# Patient Record
Sex: MALE | Race: WHITE | NOT HISPANIC OR LATINO | URBAN - METROPOLITAN AREA
[De-identification: names, ages, dates, MRNs, and addresses within clinical notes are randomized per-mention and may not be internally consistent; named-entity substitution may affect disease eponyms.]

---

## 2018-04-01 LAB
BILIRUB UR QL STRIP: NEGATIVE MG/DL
CLARITY UR: CLEAR
COLOR UR: YELLOW
COMMENT (HISTORICAL): NORMAL
GLUCOSE UR STRIP-MCNC: NEGATIVE MG/DL
HGB UR QL STRIP.AUTO: NEGATIVE
KETONES UR STRIP-MCNC: NEGATIVE MG/DL
LEUKOCYTE ESTERASE UR QL STRIP: NEGATIVE
NITRITE UR QL STRIP: NEGATIVE
PH UR STRIP.AUTO: 5 [PH] (ref 4.5–8)
PROT UR STRIP-MCNC: NEGATIVE MG/DL
SP GR UR STRIP.AUTO: 1.02 (ref 1–1.04)
UROBILINOGEN UR QL STRIP.AUTO: NEGATIVE MG/DL (ref 0–1)

## 2018-05-17 LAB
ABSOL LYMPHOCYTES (HISTORICAL): 2.1 K/UL (ref 0.5–4)
ALBUMIN SERPL BCP-MCNC: 4.5 G/DL (ref 3–5.2)
ALP SERPL-CCNC: 62 U/L (ref 43–122)
ALT SERPL W P-5'-P-CCNC: 26 U/L (ref 9–52)
ANION GAP SERPL CALCULATED.3IONS-SCNC: 10 MMOL/L (ref 5–14)
AST SERPL W P-5'-P-CCNC: 22 U/L (ref 17–59)
BASOPHILS # BLD AUTO: 0.1 K/UL (ref 0–0.1)
BASOPHILS # BLD AUTO: 1 % (ref 0–1)
BILIRUB SERPL-MCNC: 0.6 MG/DL
BUN SERPL-MCNC: 11 MG/DL (ref 5–25)
CALCIUM SERPL-MCNC: 9.7 MG/DL (ref 8.4–10.2)
CHLORIDE SERPL-SCNC: 104 MEQ/L (ref 97–108)
CHOLEST SERPL-MCNC: 299 MG/DL
CHOLEST/HDLC SERPL: 6.4 {RATIO}
CO2 SERPL-SCNC: 27 MMOL/L (ref 22–30)
CREATINE, SERUM (HISTORICAL): 0.91 MG/DL (ref 0.7–1.5)
DEPRECATED RDW RBC AUTO: 13.2 %
EGFR (HISTORICAL): >60 ML/MIN/1.73 M2
EOSINOPHIL # BLD AUTO: 0.2 K/UL (ref 0–0.4)
EOSINOPHIL NFR BLD AUTO: 3 % (ref 0–6)
ERYTHROCYTE SEDIMENTATION RATE (HISTORICAL): 6 MM (ref 1–20)
EST. AVERAGE GLUCOSE BLD GHB EST-MCNC: 120 MG/DL
GLUCOSE SERPL-MCNC: 102 MG/DL (ref 70–99)
HBA1C MFR BLD HPLC: 5.8 %
HCT VFR BLD AUTO: 46 % (ref 41–53)
HDLC SERPL-MCNC: 47 MG/DL
HGB BLD-MCNC: 15.1 G/DL (ref 13.5–17.5)
LDL/HDL RATIO (HISTORICAL): ABNORMAL
LDLC SERPL CALC-MCNC: ABNORMAL MG/DL
LYMPHOCYTES NFR BLD AUTO: 35 % (ref 25–45)
MCH RBC QN AUTO: 29.6 PG (ref 26–34)
MCHC RBC AUTO-ENTMCNC: 32.9 % (ref 31–36)
MCV RBC AUTO: 90 FL (ref 80–100)
MONOCYTES # BLD AUTO: 0.5 K/UL (ref 0.2–0.9)
MONOCYTES NFR BLD AUTO: 9 % (ref 1–10)
NEUTROPHILS ABS COUNT (HISTORICAL): 3.1 K/UL (ref 1.8–7.8)
NEUTS SEG NFR BLD AUTO: 52 % (ref 45–65)
PLATELET # BLD AUTO: 240 K/MCL (ref 150–450)
POTASSIUM SERPL-SCNC: 5.1 MEQ/L (ref 3.6–5)
PSA (HISTORICAL): 0.89 NG/ML
RBC # BLD AUTO: 5.11 M/MCL (ref 4.5–5.9)
SODIUM SERPL-SCNC: 141 MEQ/L (ref 137–147)
TOTAL PROTEIN (HISTORICAL): 7.2 G/DL (ref 5.9–8.4)
TRIGL SERPL-MCNC: 461 MG/DL
TSH SERPL DL<=0.05 MIU/L-ACNC: 3.59 UIU/ML (ref 0.47–4.68)
VLDLC SERPL CALC-MCNC: ABNORMAL MG/DL (ref 0–40)
WBC # BLD AUTO: 6 K/MCL (ref 4.5–11)

## 2018-05-18 LAB — RHEUMATOID FACTOR (HISTORICAL): <8.6 IU/ML

## 2018-05-19 LAB — TESTOSTERONE TOTAL (HISTORICAL): 268

## 2018-05-20 LAB — ANTI-NUCLEAR ANTIBODY (ANA) (HISTORICAL): NORMAL

## 2018-06-26 ENCOUNTER — OFFICE VISIT (OUTPATIENT)
Dept: FAMILY MEDICINE CLINIC | Facility: CLINIC | Age: 53
End: 2018-06-26
Payer: COMMERCIAL

## 2018-06-26 ENCOUNTER — TELEPHONE (OUTPATIENT)
Dept: FAMILY MEDICINE CLINIC | Facility: CLINIC | Age: 53
End: 2018-06-26

## 2018-06-26 DIAGNOSIS — R37 SEXUAL DYSFUNCTION: Primary | ICD-10-CM

## 2018-06-26 LAB — TESTOST SERPL-MCNC: 97 NG/DL (ref 95–948)

## 2018-06-26 PROCEDURE — 84403 ASSAY OF TOTAL TESTOSTERONE: CPT | Performed by: NURSE PRACTITIONER

## 2018-06-26 PROCEDURE — 36415 COLL VENOUS BLD VENIPUNCTURE: CPT | Performed by: NURSE PRACTITIONER

## 2018-06-27 ENCOUNTER — TELEPHONE (OUTPATIENT)
Dept: FAMILY MEDICINE CLINIC | Facility: CLINIC | Age: 53
End: 2018-06-27

## 2018-06-27 DIAGNOSIS — E29.1 HYPOGONADISM, MALE: Primary | ICD-10-CM

## 2018-06-27 NOTE — PROGRESS NOTES
Spoke to pt and he has only been on the med for two wks I did a nurse visit apt and also put the order in for more bw

## 2018-06-27 NOTE — TELEPHONE ENCOUNTER
----- Message from Gordon Brown, [address] sent at 6/27/2018  6:55 AM EDT -----  Call him and see how long he has been on the testosterone   I think only a few weeks   Need to cont on it and recheck testosterone total in 8 weeks

## 2018-06-27 NOTE — TELEPHONE ENCOUNTER
----- Message from Emy Nuñez, [address] sent at 6/27/2018  6:55 AM EDT -----  Call him and see how long he has been on the testosterone   I think only a few weeks   Need to cont on it and recheck testosterone total in 8 weeks

## 2018-06-27 NOTE — PROGRESS NOTES
Please call the patient regarding his abnormal result   I spoke to pt he has only been on the med for two weeks and I did the nurse visit and also put the order in for more bw

## 2018-06-30 DIAGNOSIS — I15.9 SECONDARY HYPERTENSION: Primary | ICD-10-CM

## 2018-07-02 ENCOUNTER — TELEPHONE (OUTPATIENT)
Dept: FAMILY MEDICINE CLINIC | Facility: CLINIC | Age: 53
End: 2018-07-02

## 2018-07-02 DIAGNOSIS — F41.9 ANXIETY: Primary | ICD-10-CM

## 2018-07-02 RX ORDER — LORAZEPAM 1 MG/1
1 TABLET ORAL 2 TIMES DAILY PRN
Qty: 60 TABLET | Refills: 0 | Status: SHIPPED | OUTPATIENT
Start: 2018-07-02 | End: 2018-08-15 | Stop reason: SDUPTHER

## 2018-07-02 RX ORDER — METOPROLOL TARTRATE 50 MG/1
TABLET, FILM COATED ORAL
Qty: 180 TABLET | Refills: 3 | Status: SHIPPED | OUTPATIENT
Start: 2018-07-02 | End: 2019-07-06 | Stop reason: SDUPTHER

## 2018-07-02 RX ORDER — LORAZEPAM 1 MG/1
TABLET ORAL
COMMUNITY
Start: 2018-02-06 | End: 2018-07-02 | Stop reason: SDUPTHER

## 2018-07-02 NOTE — TELEPHONE ENCOUNTER
Patient called he said he needs his lorazepam 1mg [facility] [phone] he said he needs it as soon as possible did inform him that you dont work on [address]   Pt # [phone]   TY

## 2018-07-17 DIAGNOSIS — G47.9 SLEEPING DIFFICULTIES: Primary | ICD-10-CM

## 2018-07-17 RX ORDER — ZOLPIDEM TARTRATE 10 MG/1
10 TABLET ORAL DAILY
Qty: 30 TABLET | Refills: 0 | Status: SHIPPED | OUTPATIENT
Start: 2018-07-17 | End: 2018-11-13 | Stop reason: SDUPTHER

## 2018-07-17 RX ORDER — ZOLPIDEM TARTRATE 10 MG/1
10 TABLET ORAL DAILY
COMMUNITY
Start: 2018-05-17 | End: 2018-07-17 | Stop reason: SDUPTHER

## 2018-07-22 DIAGNOSIS — E78.5 HYPERLIPIDEMIA, UNSPECIFIED HYPERLIPIDEMIA TYPE: Primary | ICD-10-CM

## 2018-07-23 RX ORDER — OMEGA-3-ACID ETHYL ESTERS 1 G/1
CAPSULE, LIQUID FILLED ORAL
Qty: 60 CAPSULE | Refills: 0 | Status: SHIPPED | OUTPATIENT
Start: 2018-07-23 | End: 2021-01-21 | Stop reason: ALTCHOICE

## 2018-08-04 DIAGNOSIS — F41.9 ANXIETY: Primary | ICD-10-CM

## 2018-08-05 RX ORDER — FLUOXETINE HYDROCHLORIDE 40 MG/1
CAPSULE ORAL
Qty: 90 CAPSULE | Refills: 1 | Status: SHIPPED | OUTPATIENT
Start: 2018-08-05 | End: 2018-10-28 | Stop reason: SDUPTHER

## 2018-08-15 DIAGNOSIS — F41.9 ANXIETY: ICD-10-CM

## 2018-08-15 RX ORDER — LORAZEPAM 1 MG/1
1 TABLET ORAL 2 TIMES DAILY PRN
Qty: 60 TABLET | Refills: 0 | Status: SHIPPED | OUTPATIENT
Start: 2018-08-15 | End: 2018-09-21 | Stop reason: SDUPTHER

## 2018-09-21 DIAGNOSIS — F41.9 ANXIETY: ICD-10-CM

## 2018-09-21 RX ORDER — LORAZEPAM 1 MG/1
1 TABLET ORAL 2 TIMES DAILY PRN
Qty: 60 TABLET | Refills: 0 | Status: SHIPPED | OUTPATIENT
Start: 2018-09-21 | End: 2018-11-13 | Stop reason: SDUPTHER

## 2018-10-28 DIAGNOSIS — F41.9 ANXIETY: ICD-10-CM

## 2018-10-30 RX ORDER — FLUOXETINE HYDROCHLORIDE 40 MG/1
CAPSULE ORAL
Qty: 90 CAPSULE | Refills: 0 | Status: SHIPPED | OUTPATIENT
Start: 2018-10-30 | End: 2019-01-30 | Stop reason: SDUPTHER

## 2018-11-13 DIAGNOSIS — G47.9 SLEEPING DIFFICULTIES: ICD-10-CM

## 2018-11-13 DIAGNOSIS — F41.9 ANXIETY: ICD-10-CM

## 2018-11-13 RX ORDER — LORAZEPAM 1 MG/1
1 TABLET ORAL 2 TIMES DAILY PRN
Qty: 60 TABLET | Refills: 0 | Status: SHIPPED | OUTPATIENT
Start: 2018-11-13 | End: 2019-01-07 | Stop reason: SDUPTHER

## 2018-11-13 RX ORDER — ZOLPIDEM TARTRATE 10 MG/1
10 TABLET ORAL DAILY
Qty: 30 TABLET | Refills: 0 | Status: SHIPPED | OUTPATIENT
Start: 2018-11-13 | End: 2019-01-07 | Stop reason: SDUPTHER

## 2019-01-07 DIAGNOSIS — G47.9 SLEEPING DIFFICULTIES: ICD-10-CM

## 2019-01-07 DIAGNOSIS — F41.9 ANXIETY: ICD-10-CM

## 2019-01-08 RX ORDER — ZOLPIDEM TARTRATE 10 MG/1
10 TABLET ORAL DAILY
Qty: 30 TABLET | Refills: 0 | Status: SHIPPED | OUTPATIENT
Start: 2019-01-08 | End: 2019-02-15 | Stop reason: SDUPTHER

## 2019-01-08 RX ORDER — LORAZEPAM 1 MG/1
1 TABLET ORAL 2 TIMES DAILY PRN
Qty: 60 TABLET | Refills: 0 | Status: SHIPPED | OUTPATIENT
Start: 2019-01-08 | End: 2019-02-15 | Stop reason: SDUPTHER

## 2019-01-30 DIAGNOSIS — F41.9 ANXIETY: ICD-10-CM

## 2019-01-30 RX ORDER — FLUOXETINE HYDROCHLORIDE 40 MG/1
CAPSULE ORAL
Qty: 90 CAPSULE | Refills: 0 | Status: SHIPPED | OUTPATIENT
Start: 2019-01-30 | End: 2019-05-08 | Stop reason: SDUPTHER

## 2019-02-15 DIAGNOSIS — G47.9 SLEEPING DIFFICULTIES: ICD-10-CM

## 2019-02-15 DIAGNOSIS — F41.9 ANXIETY: ICD-10-CM

## 2019-02-15 RX ORDER — LORAZEPAM 1 MG/1
1 TABLET ORAL 2 TIMES DAILY PRN
Qty: 60 TABLET | Refills: 0 | Status: SHIPPED | OUTPATIENT
Start: 2019-02-15 | End: 2019-03-19 | Stop reason: SDUPTHER

## 2019-02-15 RX ORDER — ZOLPIDEM TARTRATE 10 MG/1
10 TABLET ORAL DAILY
Qty: 30 TABLET | Refills: 0 | Status: SHIPPED | OUTPATIENT
Start: 2019-02-15 | End: 2019-03-19 | Stop reason: SDUPTHER

## 2019-03-19 DIAGNOSIS — F41.9 ANXIETY: ICD-10-CM

## 2019-03-19 DIAGNOSIS — G47.9 SLEEPING DIFFICULTIES: ICD-10-CM

## 2019-03-19 RX ORDER — LORAZEPAM 1 MG/1
1 TABLET ORAL 2 TIMES DAILY PRN
Qty: 60 TABLET | Refills: 0 | Status: SHIPPED | OUTPATIENT
Start: 2019-03-19 | End: 2019-04-23 | Stop reason: SDUPTHER

## 2019-03-19 RX ORDER — ZOLPIDEM TARTRATE 10 MG/1
10 TABLET ORAL DAILY
Qty: 30 TABLET | Refills: 0 | Status: SHIPPED | OUTPATIENT
Start: 2019-03-19 | End: 2019-04-23 | Stop reason: SDUPTHER

## 2019-04-23 DIAGNOSIS — F41.9 ANXIETY: ICD-10-CM

## 2019-04-23 DIAGNOSIS — G47.9 SLEEPING DIFFICULTIES: ICD-10-CM

## 2019-04-23 RX ORDER — ZOLPIDEM TARTRATE 10 MG/1
10 TABLET ORAL DAILY
Qty: 30 TABLET | Refills: 3 | Status: SHIPPED | OUTPATIENT
Start: 2019-04-23 | End: 2019-06-10 | Stop reason: SDUPTHER

## 2019-04-23 RX ORDER — LORAZEPAM 1 MG/1
1 TABLET ORAL 2 TIMES DAILY PRN
Qty: 60 TABLET | Refills: 3 | Status: SHIPPED | OUTPATIENT
Start: 2019-04-23 | End: 2019-06-10 | Stop reason: SDUPTHER

## 2019-05-08 DIAGNOSIS — F41.9 ANXIETY: ICD-10-CM

## 2019-05-08 RX ORDER — FLUOXETINE HYDROCHLORIDE 40 MG/1
CAPSULE ORAL
Qty: 90 CAPSULE | Refills: 0 | Status: SHIPPED | OUTPATIENT
Start: 2019-05-08 | End: 2019-09-05 | Stop reason: SDUPTHER

## 2019-06-10 DIAGNOSIS — F41.9 ANXIETY: ICD-10-CM

## 2019-06-10 DIAGNOSIS — G47.9 SLEEPING DIFFICULTIES: ICD-10-CM

## 2019-06-10 RX ORDER — LORAZEPAM 1 MG/1
1 TABLET ORAL 2 TIMES DAILY PRN
Qty: 60 TABLET | Refills: 3 | Status: SHIPPED | OUTPATIENT
Start: 2019-06-10 | End: 2019-12-19 | Stop reason: SDUPTHER

## 2019-06-10 RX ORDER — ZOLPIDEM TARTRATE 10 MG/1
10 TABLET ORAL DAILY
Qty: 30 TABLET | Refills: 3 | Status: SHIPPED | OUTPATIENT
Start: 2019-06-10 | End: 2019-12-30 | Stop reason: SDUPTHER

## 2019-06-25 DIAGNOSIS — E78.5 HYPERLIPIDEMIA, UNSPECIFIED HYPERLIPIDEMIA TYPE: Primary | ICD-10-CM

## 2019-06-25 RX ORDER — SIMVASTATIN 20 MG
20 TABLET ORAL
Qty: 90 TABLET | Refills: 3 | Status: SHIPPED | OUTPATIENT
Start: 2019-06-25 | End: 2019-11-26 | Stop reason: SDUPTHER

## 2019-07-06 DIAGNOSIS — I15.9 SECONDARY HYPERTENSION: ICD-10-CM

## 2019-07-08 RX ORDER — METOPROLOL TARTRATE 50 MG/1
TABLET, FILM COATED ORAL
Qty: 60 TABLET | Refills: 0 | Status: SHIPPED | OUTPATIENT
Start: 2019-07-08 | End: 2019-08-08 | Stop reason: SDUPTHER

## 2019-07-17 ENCOUNTER — TELEPHONE (OUTPATIENT)
Dept: FAMILY MEDICINE CLINIC | Facility: CLINIC | Age: 54
End: 2019-07-17

## 2019-07-19 DIAGNOSIS — Z87.19 H/O GASTROESOPHAGEAL REFLUX (GERD): Primary | ICD-10-CM

## 2019-07-19 RX ORDER — LANSOPRAZOLE 30 MG/1
30 CAPSULE, DELAYED RELEASE ORAL DAILY
Qty: 90 CAPSULE | Refills: 3 | Status: SHIPPED | OUTPATIENT
Start: 2019-07-19 | End: 2021-01-21 | Stop reason: SDUPTHER

## 2019-07-19 RX ORDER — LANSOPRAZOLE 30 MG/1
30 CAPSULE, DELAYED RELEASE ORAL DAILY
Refills: 3 | COMMUNITY
Start: 2019-06-19 | End: 2019-07-19 | Stop reason: SDUPTHER

## 2019-08-08 DIAGNOSIS — I15.9 SECONDARY HYPERTENSION: ICD-10-CM

## 2019-08-08 RX ORDER — METOPROLOL TARTRATE 50 MG/1
TABLET, FILM COATED ORAL
Qty: 60 TABLET | Refills: 0 | Status: SHIPPED | OUTPATIENT
Start: 2019-08-08 | End: 2019-09-06 | Stop reason: SDUPTHER

## 2019-09-05 DIAGNOSIS — F41.9 ANXIETY: ICD-10-CM

## 2019-09-05 RX ORDER — FLUOXETINE HYDROCHLORIDE 40 MG/1
CAPSULE ORAL
Qty: 90 CAPSULE | Refills: 0 | Status: SHIPPED | OUTPATIENT
Start: 2019-09-05 | End: 2019-12-13 | Stop reason: SDUPTHER

## 2019-09-06 DIAGNOSIS — I15.9 SECONDARY HYPERTENSION: ICD-10-CM

## 2019-09-06 RX ORDER — METOPROLOL TARTRATE 50 MG/1
TABLET, FILM COATED ORAL
Qty: 60 TABLET | Refills: 0 | Status: SHIPPED | OUTPATIENT
Start: 2019-09-06 | End: 2019-10-06 | Stop reason: SDUPTHER

## 2019-09-06 NOTE — TELEPHONE ENCOUNTER
Called pt lm to call office back  Per Cele Moreno his medication for Metoprolol was approved for refill but a appointment is needed for further refills  Pt hasn't been seen in over 1 year  Please advise thank you

## 2019-09-06 NOTE — TELEPHONE ENCOUNTER
Please call and advise we can not keep refilling without an appt  Needs to set up   Has been over 1 year

## 2019-09-19 ENCOUNTER — OFFICE VISIT (OUTPATIENT)
Dept: FAMILY MEDICINE CLINIC | Facility: CLINIC | Age: 54
End: 2019-09-19

## 2019-09-19 VITALS
SYSTOLIC BLOOD PRESSURE: 140 MMHG | DIASTOLIC BLOOD PRESSURE: 90 MMHG | WEIGHT: 227.1 LBS | BODY MASS INDEX: 33.64 KG/M2 | TEMPERATURE: 97.7 F | HEART RATE: 60 BPM | HEIGHT: 69 IN | RESPIRATION RATE: 18 BRPM | OXYGEN SATURATION: 98 %

## 2019-09-19 DIAGNOSIS — F51.01 PRIMARY INSOMNIA: ICD-10-CM

## 2019-09-19 DIAGNOSIS — M21.611 BUNION OF GREAT TOE OF RIGHT FOOT: ICD-10-CM

## 2019-09-19 DIAGNOSIS — E78.5 HYPERLIPIDEMIA, UNSPECIFIED HYPERLIPIDEMIA TYPE: ICD-10-CM

## 2019-09-19 DIAGNOSIS — I10 ESSENTIAL HYPERTENSION: ICD-10-CM

## 2019-09-19 DIAGNOSIS — L98.9 SKIN LESION: ICD-10-CM

## 2019-09-19 DIAGNOSIS — Z00.00 HEALTH MAINTENANCE EXAMINATION: Primary | ICD-10-CM

## 2019-09-19 PROCEDURE — 99214 OFFICE O/P EST MOD 30 MIN: CPT | Performed by: NURSE PRACTITIONER

## 2019-09-19 NOTE — PROGRESS NOTES
Assessment/Plan:         Diagnoses and all orders for this visit:    Health maintenance examination  No insurance so can't get labs   Will call and see price of lipids and cmp   If he can afford will let me know to order  If he gets insurance will let me know --needs colonoscopy,, PSA, referal to derm or plastic surgery for facial lesion   Hyperlipidemia, unspecified hyperlipidemia type  Needs lipids when he can afford or insurance  Primary insomnia  Discussed using ambien routinely      Essential hypertension  stable  Skin  Lesion  Possible BCC  Needs insurance or when he wants will refer to derm or plastic surgery   When he is ready will refer  Insurance an issue  Bunion great toe of right foot     Can't miss work wants no care at this time     Subjective:      Patient ID: Nayely Lopez is a 46 y o  male  HPI    Doing well   Occasionally needs the [place]  Sometimes needs the lorazepam    Has no insurance  The following portions of the patient's history were reviewed and updated as appropriate: allergies, current medications, past family history, past medical history, past social history, past surgical history and problem list     Review of Systems   Constitutional: Negative for activity change, appetite change, fatigue, fever and unexpected weight change  HENT: Negative for congestion, dental problem, postnasal drip and sneezing  Eyes: Negative for discharge and visual disturbance  Respiratory: Negative for cough and wheezing  Cardiovascular: Negative for chest pain  Gastrointestinal: Negative for abdominal pain, constipation, diarrhea, nausea and vomiting  Endocrine: Negative for polydipsia and polyuria  Genitourinary: Negative for dysuria and frequency  Musculoskeletal: Negative for arthralgias  Skin: Negative for rash  Allergic/Immunologic: Negative for environmental allergies and food allergies  Neurological: Negative for headaches     Hematological: Negative for adenopathy  Psychiatric/Behavioral: Positive for sleep disturbance  Negative for behavioral problems  The patient is nervous/anxious  Objective:  Vitals:    09/19/19 0710   BP: 140/90   BP Location: Left arm   Patient Position: Sitting   Cuff Size: Large   Pulse: 60   Resp: 18   Temp: 97 7 °F (36 5 °C)   TempSrc: Temporal   SpO2: 98%   Weight: 103 kg (227 lb 1 6 oz)   Height: 5' 9" (1 753 m)      Physical Exam   Constitutional: He is oriented to person, place, and time  He appears well-developed and well-nourished  HENT:   Head: Normocephalic  Right Ear: External ear normal    Left Ear: External ear normal    Nose: Nose normal    Mouth/Throat: Oropharynx is clear and moist    Eyes: Pupils are equal, round, and reactive to light  Conjunctivae are normal  Right eye exhibits no discharge  Left eye exhibits no discharge  Neck: Normal range of motion  Neck supple  No thyromegaly present  Cardiovascular: Normal rate, regular rhythm and normal heart sounds  No murmur heard  Pulmonary/Chest: Effort normal and breath sounds normal    Abdominal: Soft  Bowel sounds are normal  There is no tenderness  Musculoskeletal: Normal range of motion  Feet:    Lymphadenopathy:     He has no cervical adenopathy  Neurological: He is alert and oriented to person, place, and time  Skin: Skin is warm  No rash noted  Psychiatric: He has a normal mood and affect  His behavior is normal    Vitals reviewed

## 2019-10-06 DIAGNOSIS — I15.9 SECONDARY HYPERTENSION: ICD-10-CM

## 2019-10-07 ENCOUNTER — DOCUMENTATION (OUTPATIENT)
Dept: FAMILY MEDICINE CLINIC | Facility: CLINIC | Age: 54
End: 2019-10-07

## 2019-10-07 RX ORDER — METOPROLOL TARTRATE 50 MG/1
TABLET, FILM COATED ORAL
Qty: 180 TABLET | Refills: 1 | Status: SHIPPED | OUTPATIENT
Start: 2019-10-07 | End: 2020-04-07

## 2019-11-26 DIAGNOSIS — E78.5 HYPERLIPIDEMIA, UNSPECIFIED HYPERLIPIDEMIA TYPE: ICD-10-CM

## 2019-11-26 RX ORDER — SIMVASTATIN 20 MG
20 TABLET ORAL
Qty: 90 TABLET | Refills: 3 | Status: SHIPPED | OUTPATIENT
Start: 2019-11-26 | End: 2020-09-16 | Stop reason: SDUPTHER

## 2019-12-13 DIAGNOSIS — F41.9 ANXIETY: ICD-10-CM

## 2019-12-13 RX ORDER — FLUOXETINE HYDROCHLORIDE 40 MG/1
CAPSULE ORAL
Qty: 30 CAPSULE | Refills: 0 | Status: SHIPPED | OUTPATIENT
Start: 2019-12-13 | End: 2020-01-28

## 2019-12-19 DIAGNOSIS — F41.9 ANXIETY: ICD-10-CM

## 2019-12-20 RX ORDER — LORAZEPAM 1 MG/1
TABLET ORAL
Qty: 60 TABLET | Refills: 0 | Status: SHIPPED | OUTPATIENT
Start: 2019-12-20 | End: 2020-03-02

## 2019-12-30 DIAGNOSIS — G47.9 SLEEPING DIFFICULTIES: ICD-10-CM

## 2019-12-30 RX ORDER — ZOLPIDEM TARTRATE 10 MG/1
TABLET ORAL
Qty: 30 TABLET | Refills: 0 | Status: SHIPPED | OUTPATIENT
Start: 2019-12-30 | End: 2020-03-02

## 2020-01-27 DIAGNOSIS — F41.9 ANXIETY: ICD-10-CM

## 2020-01-28 RX ORDER — FLUOXETINE HYDROCHLORIDE 40 MG/1
CAPSULE ORAL
Qty: 30 CAPSULE | Refills: 0 | Status: SHIPPED | OUTPATIENT
Start: 2020-01-28 | End: 2020-03-03

## 2020-03-02 DIAGNOSIS — G47.9 SLEEPING DIFFICULTIES: ICD-10-CM

## 2020-03-02 DIAGNOSIS — F41.9 ANXIETY: ICD-10-CM

## 2020-03-02 RX ORDER — ZOLPIDEM TARTRATE 10 MG/1
TABLET ORAL
Qty: 30 TABLET | Refills: 0 | Status: SHIPPED | OUTPATIENT
Start: 2020-03-02 | End: 2020-04-16

## 2020-03-02 RX ORDER — LORAZEPAM 1 MG/1
TABLET ORAL
Qty: 60 TABLET | Refills: 0 | Status: SHIPPED | OUTPATIENT
Start: 2020-03-02 | End: 2020-04-22

## 2020-03-02 NOTE — TELEPHONE ENCOUNTER
pdmp checked for lorazepam and zolpidem  Lorazepam last filled 12/20/19 for 30 day supply  Zolpidem last filled 12/20/19 for 30 day supply

## 2020-03-03 RX ORDER — FLUOXETINE HYDROCHLORIDE 40 MG/1
CAPSULE ORAL
Qty: 30 CAPSULE | Refills: 0 | Status: SHIPPED | OUTPATIENT
Start: 2020-03-03 | End: 2020-03-30

## 2020-03-30 DIAGNOSIS — F41.9 ANXIETY: ICD-10-CM

## 2020-03-30 RX ORDER — FLUOXETINE HYDROCHLORIDE 40 MG/1
CAPSULE ORAL
Qty: 30 CAPSULE | Refills: 0 | Status: SHIPPED | OUTPATIENT
Start: 2020-03-30 | End: 2020-05-07

## 2020-04-07 DIAGNOSIS — I15.9 SECONDARY HYPERTENSION: ICD-10-CM

## 2020-04-07 RX ORDER — METOPROLOL TARTRATE 50 MG/1
TABLET, FILM COATED ORAL
Qty: 180 TABLET | Refills: 1 | Status: SHIPPED | OUTPATIENT
Start: 2020-04-07 | End: 2020-10-05 | Stop reason: SDUPTHER

## 2020-04-16 DIAGNOSIS — G47.9 SLEEPING DIFFICULTIES: ICD-10-CM

## 2020-04-16 RX ORDER — ZOLPIDEM TARTRATE 10 MG/1
TABLET ORAL
Qty: 30 TABLET | Refills: 5 | Status: SHIPPED | OUTPATIENT
Start: 2020-04-16 | End: 2020-11-15

## 2020-04-22 DIAGNOSIS — F41.9 ANXIETY: ICD-10-CM

## 2020-04-22 RX ORDER — LORAZEPAM 1 MG/1
TABLET ORAL
Qty: 60 TABLET | Refills: 0 | Status: SHIPPED | OUTPATIENT
Start: 2020-04-22 | End: 2020-07-25

## 2020-05-07 DIAGNOSIS — F41.9 ANXIETY: ICD-10-CM

## 2020-05-07 RX ORDER — FLUOXETINE HYDROCHLORIDE 40 MG/1
CAPSULE ORAL
Qty: 90 CAPSULE | Refills: 1 | Status: SHIPPED | OUTPATIENT
Start: 2020-05-07 | End: 2020-10-18

## 2020-07-25 DIAGNOSIS — F41.9 ANXIETY: ICD-10-CM

## 2020-07-25 RX ORDER — LORAZEPAM 1 MG/1
TABLET ORAL
Qty: 60 TABLET | Refills: 0 | Status: SHIPPED | OUTPATIENT
Start: 2020-07-25 | End: 2020-11-15

## 2020-09-16 DIAGNOSIS — E78.5 HYPERLIPIDEMIA, UNSPECIFIED HYPERLIPIDEMIA TYPE: ICD-10-CM

## 2020-09-16 RX ORDER — SIMVASTATIN 20 MG
20 TABLET ORAL
Qty: 90 TABLET | Refills: 3 | Status: SHIPPED | OUTPATIENT
Start: 2020-09-16 | End: 2021-10-27

## 2020-09-29 ENCOUNTER — TELEPHONE (OUTPATIENT)
Dept: FAMILY MEDICINE CLINIC | Facility: CLINIC | Age: 55
End: 2020-09-29

## 2020-09-29 DIAGNOSIS — Z00.00 HEALTH MAINTENANCE EXAMINATION: ICD-10-CM

## 2020-09-29 DIAGNOSIS — E78.5 HYPERLIPIDEMIA, UNSPECIFIED HYPERLIPIDEMIA TYPE: Primary | ICD-10-CM

## 2020-09-29 DIAGNOSIS — Z12.5 SCREENING FOR MALIGNANT NEOPLASM OF PROSTATE: ICD-10-CM

## 2020-09-29 DIAGNOSIS — I15.9 SECONDARY HYPERTENSION: ICD-10-CM

## 2020-09-29 NOTE — TELEPHONE ENCOUNTER
Patient calling requesting routine blood work  Patient states that he didn't have insurance and that Elvi advised when he got new insurance through work to call and request blood work  Please advise

## 2020-10-05 DIAGNOSIS — I15.9 SECONDARY HYPERTENSION: ICD-10-CM

## 2020-10-05 RX ORDER — METOPROLOL TARTRATE 50 MG/1
TABLET, FILM COATED ORAL
Qty: 180 TABLET | Refills: 0 | Status: SHIPPED | OUTPATIENT
Start: 2020-10-05 | End: 2021-02-02

## 2020-10-18 DIAGNOSIS — F41.9 ANXIETY: ICD-10-CM

## 2020-10-18 RX ORDER — FLUOXETINE HYDROCHLORIDE 40 MG/1
CAPSULE ORAL
Qty: 90 CAPSULE | Refills: 0 | Status: SHIPPED | OUTPATIENT
Start: 2020-10-18 | End: 2021-02-27

## 2020-11-13 DIAGNOSIS — G47.9 SLEEPING DIFFICULTIES: ICD-10-CM

## 2020-11-13 DIAGNOSIS — F41.9 ANXIETY: ICD-10-CM

## 2020-11-15 RX ORDER — ZOLPIDEM TARTRATE 10 MG/1
TABLET ORAL
Qty: 30 TABLET | Refills: 4 | Status: SHIPPED | OUTPATIENT
Start: 2020-11-15 | End: 2021-08-18

## 2020-11-15 RX ORDER — LORAZEPAM 1 MG/1
TABLET ORAL
Qty: 60 TABLET | Refills: 4 | Status: SHIPPED | OUTPATIENT
Start: 2020-11-15 | End: 2021-06-06

## 2021-01-21 ENCOUNTER — OFFICE VISIT (OUTPATIENT)
Dept: FAMILY MEDICINE CLINIC | Facility: CLINIC | Age: 56
End: 2021-01-21
Payer: COMMERCIAL

## 2021-01-21 VITALS
TEMPERATURE: 98 F | OXYGEN SATURATION: 98 % | WEIGHT: 225.3 LBS | RESPIRATION RATE: 16 BRPM | DIASTOLIC BLOOD PRESSURE: 80 MMHG | HEIGHT: 69 IN | HEART RATE: 56 BPM | SYSTOLIC BLOOD PRESSURE: 138 MMHG | BODY MASS INDEX: 33.37 KG/M2

## 2021-01-21 DIAGNOSIS — R73.09 ABNORMAL GLUCOSE: ICD-10-CM

## 2021-01-21 DIAGNOSIS — Z11.59 NEED FOR HEPATITIS C SCREENING TEST: ICD-10-CM

## 2021-01-21 DIAGNOSIS — K21.9 GASTROESOPHAGEAL REFLUX DISEASE WITHOUT ESOPHAGITIS: ICD-10-CM

## 2021-01-21 DIAGNOSIS — Z11.4 SCREENING FOR HIV (HUMAN IMMUNODEFICIENCY VIRUS): ICD-10-CM

## 2021-01-21 DIAGNOSIS — Z12.11 SCREENING FOR COLORECTAL CANCER: ICD-10-CM

## 2021-01-21 DIAGNOSIS — Z23 ENCOUNTER FOR IMMUNIZATION: ICD-10-CM

## 2021-01-21 DIAGNOSIS — Z87.19 H/O GASTROESOPHAGEAL REFLUX (GERD): ICD-10-CM

## 2021-01-21 DIAGNOSIS — Z00.00 ANNUAL PHYSICAL EXAM: Primary | ICD-10-CM

## 2021-01-21 DIAGNOSIS — I10 ESSENTIAL HYPERTENSION: ICD-10-CM

## 2021-01-21 DIAGNOSIS — M25.50 ARTHRALGIA, UNSPECIFIED JOINT: ICD-10-CM

## 2021-01-21 DIAGNOSIS — Z12.12 SCREENING FOR COLORECTAL CANCER: ICD-10-CM

## 2021-01-21 DIAGNOSIS — E78.5 HYPERLIPIDEMIA, UNSPECIFIED HYPERLIPIDEMIA TYPE: ICD-10-CM

## 2021-01-21 DIAGNOSIS — IMO0001 LOW SEXUAL DESIRE DISORDER: ICD-10-CM

## 2021-01-21 LAB
ALBUMIN SERPL BCP-MCNC: 4.2 G/DL (ref 3.5–5)
ALP SERPL-CCNC: 67 U/L (ref 46–116)
ALT SERPL W P-5'-P-CCNC: 47 U/L (ref 12–78)
ANION GAP SERPL CALCULATED.3IONS-SCNC: 3 MMOL/L (ref 4–13)
AST SERPL W P-5'-P-CCNC: 28 U/L (ref 5–45)
BASOPHILS # BLD AUTO: 0.03 THOUSANDS/ΜL (ref 0–0.1)
BASOPHILS NFR BLD AUTO: 1 % (ref 0–1)
BILIRUB SERPL-MCNC: 0.62 MG/DL (ref 0.2–1)
BUN SERPL-MCNC: 14 MG/DL (ref 5–25)
CALCIUM SERPL-MCNC: 9.3 MG/DL (ref 8.3–10.1)
CHLORIDE SERPL-SCNC: 105 MMOL/L (ref 100–108)
CHOLEST SERPL-MCNC: 246 MG/DL (ref 50–200)
CO2 SERPL-SCNC: 30 MMOL/L (ref 21–32)
CREAT SERPL-MCNC: 1.07 MG/DL (ref 0.6–1.3)
CRP SERPL QL: <3 MG/L
EOSINOPHIL # BLD AUTO: 0.25 THOUSAND/ΜL (ref 0–0.61)
EOSINOPHIL NFR BLD AUTO: 5 % (ref 0–6)
ERYTHROCYTE [DISTWIDTH] IN BLOOD BY AUTOMATED COUNT: 18.8 % (ref 11.6–15.1)
GFR SERPL CREATININE-BSD FRML MDRD: 77 ML/MIN/1.73SQ M
GLUCOSE P FAST SERPL-MCNC: 117 MG/DL (ref 65–99)
HCT VFR BLD AUTO: 46.7 % (ref 36.5–49.3)
HDLC SERPL-MCNC: 54 MG/DL
HGB BLD-MCNC: 15.1 G/DL (ref 12–17)
IMM GRANULOCYTES # BLD AUTO: 0.04 THOUSAND/UL (ref 0–0.2)
IMM GRANULOCYTES NFR BLD AUTO: 1 % (ref 0–2)
LDLC SERPL CALC-MCNC: 158 MG/DL (ref 0–100)
LYMPHOCYTES # BLD AUTO: 1.6 THOUSANDS/ΜL (ref 0.6–4.47)
LYMPHOCYTES NFR BLD AUTO: 31 % (ref 14–44)
MCH RBC QN AUTO: 26.8 PG (ref 26.8–34.3)
MCHC RBC AUTO-ENTMCNC: 32.3 G/DL (ref 31.4–37.4)
MCV RBC AUTO: 83 FL (ref 82–98)
MONOCYTES # BLD AUTO: 0.57 THOUSAND/ΜL (ref 0.17–1.22)
MONOCYTES NFR BLD AUTO: 11 % (ref 4–12)
NEUTROPHILS # BLD AUTO: 2.76 THOUSANDS/ΜL (ref 1.85–7.62)
NEUTS SEG NFR BLD AUTO: 51 % (ref 43–75)
NONHDLC SERPL-MCNC: 192 MG/DL
NRBC BLD AUTO-RTO: 0 /100 WBCS
PLATELET # BLD AUTO: 238 THOUSANDS/UL (ref 149–390)
PMV BLD AUTO: 9.2 FL (ref 8.9–12.7)
POTASSIUM SERPL-SCNC: 4.4 MMOL/L (ref 3.5–5.3)
PROT SERPL-MCNC: 7.5 G/DL (ref 6.4–8.2)
RBC # BLD AUTO: 5.64 MILLION/UL (ref 3.88–5.62)
SODIUM SERPL-SCNC: 138 MMOL/L (ref 136–145)
TESTOST SERPL-MCNC: 112 NG/DL (ref 95–948)
TRIGL SERPL-MCNC: 169 MG/DL
TSH SERPL DL<=0.05 MIU/L-ACNC: 1.42 UIU/ML (ref 0.36–3.74)
WBC # BLD AUTO: 5.25 THOUSAND/UL (ref 4.31–10.16)

## 2021-01-21 PROCEDURE — 86038 ANTINUCLEAR ANTIBODIES: CPT | Performed by: NURSE PRACTITIONER

## 2021-01-21 PROCEDURE — 86618 LYME DISEASE ANTIBODY: CPT | Performed by: NURSE PRACTITIONER

## 2021-01-21 PROCEDURE — 90715 TDAP VACCINE 7 YRS/> IM: CPT

## 2021-01-21 PROCEDURE — 84403 ASSAY OF TOTAL TESTOSTERONE: CPT | Performed by: NURSE PRACTITIONER

## 2021-01-21 PROCEDURE — 84443 ASSAY THYROID STIM HORMONE: CPT | Performed by: NURSE PRACTITIONER

## 2021-01-21 PROCEDURE — 1036F TOBACCO NON-USER: CPT | Performed by: NURSE PRACTITIONER

## 2021-01-21 PROCEDURE — 80053 COMPREHEN METABOLIC PANEL: CPT | Performed by: NURSE PRACTITIONER

## 2021-01-21 PROCEDURE — 86803 HEPATITIS C AB TEST: CPT | Performed by: NURSE PRACTITIONER

## 2021-01-21 PROCEDURE — 3725F SCREEN DEPRESSION PERFORMED: CPT | Performed by: NURSE PRACTITIONER

## 2021-01-21 PROCEDURE — 99396 PREV VISIT EST AGE 40-64: CPT | Performed by: NURSE PRACTITIONER

## 2021-01-21 PROCEDURE — 3079F DIAST BP 80-89 MM HG: CPT | Performed by: NURSE PRACTITIONER

## 2021-01-21 PROCEDURE — 86140 C-REACTIVE PROTEIN: CPT | Performed by: NURSE PRACTITIONER

## 2021-01-21 PROCEDURE — 36415 COLL VENOUS BLD VENIPUNCTURE: CPT | Performed by: NURSE PRACTITIONER

## 2021-01-21 PROCEDURE — 80061 LIPID PANEL: CPT | Performed by: NURSE PRACTITIONER

## 2021-01-21 PROCEDURE — 86430 RHEUMATOID FACTOR TEST QUAL: CPT | Performed by: NURSE PRACTITIONER

## 2021-01-21 PROCEDURE — 87389 HIV-1 AG W/HIV-1&-2 AB AG IA: CPT | Performed by: NURSE PRACTITIONER

## 2021-01-21 PROCEDURE — 3008F BODY MASS INDEX DOCD: CPT | Performed by: NURSE PRACTITIONER

## 2021-01-21 PROCEDURE — 3075F SYST BP GE 130 - 139MM HG: CPT | Performed by: NURSE PRACTITIONER

## 2021-01-21 PROCEDURE — 90471 IMMUNIZATION ADMIN: CPT

## 2021-01-21 PROCEDURE — 83036 HEMOGLOBIN GLYCOSYLATED A1C: CPT | Performed by: NURSE PRACTITIONER

## 2021-01-21 PROCEDURE — 85025 COMPLETE CBC W/AUTO DIFF WBC: CPT | Performed by: NURSE PRACTITIONER

## 2021-01-21 RX ORDER — FAMOTIDINE 40 MG/1
40 TABLET, FILM COATED ORAL
Qty: 30 TABLET | Refills: 3 | Status: SHIPPED | OUTPATIENT
Start: 2021-01-21 | End: 2022-01-21 | Stop reason: ALTCHOICE

## 2021-01-21 RX ORDER — LANSOPRAZOLE 30 MG/1
30 CAPSULE, DELAYED RELEASE ORAL DAILY
Qty: 90 CAPSULE | Refills: 3 | Status: SHIPPED | OUTPATIENT
Start: 2021-01-21

## 2021-01-21 RX ORDER — MELOXICAM 7.5 MG/1
7.5 TABLET ORAL DAILY
Qty: 30 TABLET | Refills: 1 | Status: SHIPPED | OUTPATIENT
Start: 2021-01-21 | End: 2021-03-24

## 2021-01-21 NOTE — PROGRESS NOTES
[address]    NAME: Micah Houston  AGE: 63 y o  SEX: male  : 1965     DATE: 2021     Assessment and Plan:     Problem List Items Addressed This Visit        Digestive    Gastroesophageal reflux disease    Relevant Medications    lansoprazole (PREVACID) 30 mg capsule    famotidine (PEPCID) 40 MG tablet       Cardiovascular and Mediastinum    Hypertension    Relevant Orders    CBC and differential    Comprehensive metabolic panel    TSH, 3rd generation with Free T4 reflex       Other    Hyperlipidemia    Relevant Orders    Lipid panel      Other Visit Diagnoses     Annual physical exam    -  Primary    Need for hepatitis C screening test        Relevant Orders    Hepatitis C Antibody ([facility])    Screening for HIV (human immunodeficiency virus)        Relevant Orders    HIV 1/2 Antigen/Antibody (4th Generation) w Reflex [facility]    Encounter for immunization        Relevant Orders    TDAP VACCINE GREATER THAN OR EQUAL TO 6YO IM    Screening for colorectal cancer        Relevant Orders    Ambulatory referral to Gastroenterology    BMI 33 0-33 9,adult        H/O gastroesophageal reflux (GERD)        Relevant Medications    lansoprazole (PREVACID) 30 mg capsule    Arthralgia, unspecified joint        Relevant Medications    meloxicam (MOBIC) 7 5 mg tablet    Other Relevant Orders    TSH, 3rd generation with Free T4 reflex    RF Screen w/ Reflex to Titer    TRISH Screen w/ Reflex to Titer/Pattern    C-reactive protein    Lyme Antibody Profile with reflex to WB    Low sexual desire disorder        Relevant Orders    Testosterone          Immunizations and preventive care screenings were discussed with patient today  Appropriate education was printed on patient's after visit summary      Counseling:  Alcohol/drug use: discussed moderation in alcohol intake, the recommendations for healthy alcohol use, and avoidance of illicit drug use  Dental Health: discussed importance of regular tooth brushing, flossing, and dental visits  · Exercise: the importance of regular exercise/physical activity was discussed  Recommend exercise 3-5 times per week for at least 30 minutes  BMI Counseling: Body mass index is 33 27 kg/m²  The BMI is above normal  Nutrition recommendations include decreasing portion sizes, encouraging healthy choices of fruits and vegetables, decreasing fast food intake, consuming healthier snacks, limiting drinks that contain sugar and moderation in carbohydrate intake  Exercise recommendations include exercising 3-5 times per week  No pharmacotherapy was ordered  No follow-ups on file  Chief Complaint:     Chief Complaint   Patient presents with    Abdominal Pain      History of Present Illness:     Adult Annual Physical   Patient here for a comprehensive physical exam  The patient reports problems - hx of diverticulitis   Recent flare and not resolving   No fevers or chills   Having bloating with stabbing pain  Going on just less than 1 week  Does not keep up with health maintenance   Having very painful arthritis especially in hands    IBU not helping   Shoulders and various all over  Works construction     Diet and Physical Activity  · Diet/Nutrition: well balanced diet and limited fruits/vegetables  · Exercise: 3-4 times a week on average  Depression Screening  PHQ-9 Depression Screening    PHQ-9:   Frequency of the following problems over the past two weeks:      Little interest or pleasure in doing things: 0 - not at all  Feeling down, depressed, or hopeless: 0 - not at all  PHQ-2 Score: 0       General Health  · Sleep: sleeps well  · Hearing: normal - bilateral   · Vision: wears glasses  · Dental: false teeth   Health  · Symptoms include: none     Review of Systems:     Review of Systems   Constitutional: Negative for chills and fever     HENT: Negative for congestion, dental problem, ear pain and sore throat  Eyes: Negative for pain and visual disturbance  Respiratory: Negative for cough and shortness of breath  Cardiovascular: Negative for chest pain and palpitations  Gastrointestinal: Negative for abdominal pain and vomiting  Genitourinary: Negative for dysuria and hematuria  Musculoskeletal: Positive for arthralgias, back pain, neck pain and neck stiffness  Skin: Negative for color change and rash  Neurological: Negative for seizures and syncope  All other systems reviewed and are negative  Past Medical History:     History reviewed  No pertinent past medical history     Past Surgical History:     Past Surgical History:   Procedure Laterality Date    ANTERIOR CRUCIATE LIGAMENT REPAIR      CARPAL TUNNEL RELEASE      ELBOW SURGERY      ULNAR TUNNEL RELEASE  2005      Family History:     Family History   Problem Relation Age of Onset    Pancreatic cancer Father       Social History:     E-Cigarette/Vaping    E-Cigarette Use Never User      E-Cigarette/Vaping Substances    Nicotine No     THC No     CBD No     Flavoring No     Other No     Unknown No      Social History     Socioeconomic History    Marital status: Unknown     Spouse name: None    Number of children: None    Years of education: None    Highest education level: None   Occupational History    None   Social Needs    Financial resource strain: None    Food insecurity     Worry: None     Inability: None    Transportation needs     Medical: None     Non-medical: None   Tobacco Use    Smoking status: Former Smoker     Types: Cigarettes    Smokeless tobacco: Never Used   Substance and Sexual Activity    Alcohol use: Yes     Frequency: Monthly or less    Drug use: Yes     Types: Marijuana    Sexual activity: None   Lifestyle    Physical activity     Days per week: None     Minutes per session: None    Stress: None   Relationships    Social connections     Talks on phone: None Gets together: None     Attends Yazdanism service: None     Active member of club or organization: None     Attends meetings of clubs or organizations: None     Relationship status: None    Intimate partner violence     Fear of current or ex partner: None     Emotionally abused: None     Physically abused: None     Forced sexual activity: None   Other Topics Concern    None   Social History Narrative    None      Current Medications:     Current Outpatient Medications   Medication Sig Dispense Refill    FLUoxetine (PROzac) 40 MG capsule TAKE ONE CAPSULE BY MOUTH EVERY MORNING  90 capsule 0    LORazepam (ATIVAN) 1 mg tablet TAKE ONE TABLET BY MOUTH TWICE DAILY AS NEEDED FOR ANXIETY  60 tablet 4    metoprolol tartrate (LOPRESSOR) 50 mg tablet TAKE ONE TABLET BY MOUTH TWICE DAILY  180 tablet 0    simvastatin (ZOCOR) 20 mg tablet Take 1 tablet (20 mg total) by mouth daily at bedtime 90 tablet 3    zolpidem (AMBIEN) 10 mg tablet TAKE ONE TABLET BY MOUTH DAILY AS NEEDED for sleep 30 tablet 4    famotidine (PEPCID) 40 MG tablet Take 1 tablet (40 mg total) by mouth daily at bedtime as needed for heartburn 30 tablet 3    lansoprazole (PREVACID) 30 mg capsule Take 1 capsule (30 mg total) by mouth daily Take before a meal 90 capsule 3    meloxicam (MOBIC) 7 5 mg tablet Take 1 tablet (7 5 mg total) by mouth daily 30 tablet 1     No current facility-administered medications for this visit  Allergies: Allergies   Allergen Reactions    Sulfa Antibiotics Anaphylaxis    Erythromycin Abdominal Pain    Penicillins       Physical Exam:     /80 (BP Location: Left arm, Patient Position: Sitting, Cuff Size: Large)   Pulse 56   Temp 98 °F (36 7 °C) (Tympanic)   Resp 16   Ht 5' 9" (1 753 m)   Wt 102 kg (225 lb 4 8 oz)   SpO2 98%   BMI 33 27 kg/m²     Physical Exam  Vitals signs and nursing note reviewed  Constitutional:       Appearance: He is well-developed     HENT:      Head: Normocephalic and atraumatic  Eyes:      Conjunctiva/sclera: Conjunctivae normal       Pupils: Pupils are equal, round, and reactive to light  Neck:      Musculoskeletal: Neck supple  Cardiovascular:      Rate and Rhythm: Normal rate and regular rhythm  Heart sounds: Normal heart sounds  No murmur  Pulmonary:      Effort: Pulmonary effort is normal  No respiratory distress  Breath sounds: Normal breath sounds  Abdominal:      General: Bowel sounds are normal       Palpations: Abdomen is soft  Tenderness: There is abdominal tenderness in the epigastric area and left lower quadrant  There is no guarding or rebound  Skin:     General: Skin is warm and dry  Neurological:      Mental Status: He is alert and oriented to person, place, and time            Cheli White, [address]

## 2021-01-21 NOTE — PATIENT INSTRUCTIONS

## 2021-01-22 DIAGNOSIS — R73.09 ABNORMAL GLUCOSE: Primary | ICD-10-CM

## 2021-01-22 LAB
B BURGDOR IGG+IGM SER-ACNC: 18
EST. AVERAGE GLUCOSE BLD GHB EST-MCNC: 126 MG/DL
HBA1C MFR BLD: 6 %
HCV AB SER QL: NORMAL
HIV 1+2 AB+HIV1 P24 AG SERPL QL IA: NORMAL
RHEUMATOID FACT SER QL LA: NEGATIVE
RYE IGE QN: NEGATIVE

## 2021-01-26 ENCOUNTER — TELEPHONE (OUTPATIENT)
Dept: FAMILY MEDICINE CLINIC | Facility: CLINIC | Age: 56
End: 2021-01-26

## 2021-01-26 DIAGNOSIS — R79.89 LOW TESTOSTERONE IN MALE: Primary | ICD-10-CM

## 2021-01-26 DIAGNOSIS — E29.1 HYPOGONADISM MALE: ICD-10-CM

## 2021-01-26 NOTE — TELEPHONE ENCOUNTER
Patient Looked at [employer]  Testosterone extemely low   wants to get back on injections  Please advise  [phone]

## 2021-02-02 DIAGNOSIS — I15.9 SECONDARY HYPERTENSION: ICD-10-CM

## 2021-02-02 RX ORDER — METOPROLOL TARTRATE 50 MG/1
TABLET, FILM COATED ORAL
Qty: 180 TABLET | Refills: 0 | Status: SHIPPED | OUTPATIENT
Start: 2021-02-02 | End: 2021-05-09 | Stop reason: SDUPTHER

## 2021-02-27 DIAGNOSIS — F41.9 ANXIETY: ICD-10-CM

## 2021-02-27 RX ORDER — FLUOXETINE HYDROCHLORIDE 40 MG/1
CAPSULE ORAL
Qty: 90 CAPSULE | Refills: 0 | Status: SHIPPED | OUTPATIENT
Start: 2021-02-27 | End: 2021-06-03

## 2021-03-18 ENCOUNTER — DOCUMENTATION (OUTPATIENT)
Dept: FAMILY MEDICINE CLINIC | Facility: CLINIC | Age: 56
End: 2021-03-18

## 2021-03-24 DIAGNOSIS — M25.50 ARTHRALGIA, UNSPECIFIED JOINT: ICD-10-CM

## 2021-03-24 RX ORDER — MELOXICAM 7.5 MG/1
TABLET ORAL
Qty: 30 TABLET | Refills: 1 | Status: SHIPPED | OUTPATIENT
Start: 2021-03-24 | End: 2022-01-21 | Stop reason: ALTCHOICE

## 2021-05-08 DIAGNOSIS — I15.9 SECONDARY HYPERTENSION: ICD-10-CM

## 2021-05-09 RX ORDER — METOPROLOL TARTRATE 50 MG/1
TABLET, FILM COATED ORAL
Qty: 180 TABLET | Refills: 0 | Status: SHIPPED | OUTPATIENT
Start: 2021-05-09 | End: 2021-08-18

## 2021-06-03 DIAGNOSIS — F41.9 ANXIETY: ICD-10-CM

## 2021-06-03 RX ORDER — FLUOXETINE HYDROCHLORIDE 40 MG/1
CAPSULE ORAL
Qty: 90 CAPSULE | Refills: 0 | Status: SHIPPED | OUTPATIENT
Start: 2021-06-03 | End: 2021-08-30

## 2021-06-06 DIAGNOSIS — F41.9 ANXIETY: ICD-10-CM

## 2021-06-06 RX ORDER — LORAZEPAM 1 MG/1
TABLET ORAL
Qty: 60 TABLET | Refills: 0 | Status: SHIPPED | OUTPATIENT
Start: 2021-06-06 | End: 2021-08-18

## 2021-08-17 DIAGNOSIS — I15.9 SECONDARY HYPERTENSION: ICD-10-CM

## 2021-08-18 RX ORDER — METOPROLOL TARTRATE 50 MG/1
TABLET, FILM COATED ORAL
Qty: 180 TABLET | Refills: 0 | Status: SHIPPED | OUTPATIENT
Start: 2021-08-18 | End: 2021-11-24 | Stop reason: SDUPTHER

## 2021-08-28 DIAGNOSIS — F41.9 ANXIETY: ICD-10-CM

## 2021-08-30 RX ORDER — FLUOXETINE HYDROCHLORIDE 40 MG/1
CAPSULE ORAL
Qty: 90 CAPSULE | Refills: 0 | Status: SHIPPED | OUTPATIENT
Start: 2021-08-30 | End: 2021-11-23

## 2021-10-27 DIAGNOSIS — E78.5 HYPERLIPIDEMIA, UNSPECIFIED HYPERLIPIDEMIA TYPE: ICD-10-CM

## 2021-10-27 RX ORDER — SIMVASTATIN 20 MG
TABLET ORAL
Qty: 90 TABLET | Refills: 0 | Status: SHIPPED | OUTPATIENT
Start: 2021-10-27 | End: 2022-02-16

## 2021-11-24 DIAGNOSIS — F41.9 ANXIETY: ICD-10-CM

## 2021-11-24 DIAGNOSIS — I15.9 SECONDARY HYPERTENSION: ICD-10-CM

## 2021-11-24 RX ORDER — METOPROLOL TARTRATE 50 MG/1
50 TABLET, FILM COATED ORAL 2 TIMES DAILY
Qty: 180 TABLET | Refills: 0 | Status: SHIPPED | OUTPATIENT
Start: 2021-11-24 | End: 2022-02-21 | Stop reason: SDUPTHER

## 2021-11-24 RX ORDER — LORAZEPAM 1 MG/1
1 TABLET ORAL 2 TIMES DAILY PRN
Qty: 60 TABLET | Refills: 0 | Status: SHIPPED | OUTPATIENT
Start: 2021-11-24 | End: 2022-02-25

## 2021-11-29 ENCOUNTER — TELEPHONE (OUTPATIENT)
Dept: FAMILY MEDICINE CLINIC | Facility: CLINIC | Age: 56
End: 2021-11-29

## 2022-01-17 DIAGNOSIS — F41.9 ANXIETY: ICD-10-CM

## 2022-01-17 RX ORDER — FLUOXETINE HYDROCHLORIDE 40 MG/1
CAPSULE ORAL
Qty: 60 CAPSULE | Refills: 0 | Status: SHIPPED | OUTPATIENT
Start: 2022-01-17 | End: 2022-03-17

## 2022-01-21 ENCOUNTER — OFFICE VISIT (OUTPATIENT)
Dept: FAMILY MEDICINE CLINIC | Facility: CLINIC | Age: 57
End: 2022-01-21

## 2022-01-21 VITALS
RESPIRATION RATE: 18 BRPM | DIASTOLIC BLOOD PRESSURE: 90 MMHG | HEIGHT: 69 IN | TEMPERATURE: 97.1 F | WEIGHT: 238.4 LBS | HEART RATE: 61 BPM | OXYGEN SATURATION: 97 % | SYSTOLIC BLOOD PRESSURE: 134 MMHG | BODY MASS INDEX: 35.31 KG/M2

## 2022-01-21 DIAGNOSIS — I10 PRIMARY HYPERTENSION: ICD-10-CM

## 2022-01-21 DIAGNOSIS — K43.9 VENTRAL HERNIA WITHOUT OBSTRUCTION OR GANGRENE: ICD-10-CM

## 2022-01-21 DIAGNOSIS — Z12.11 SCREENING FOR COLORECTAL CANCER: ICD-10-CM

## 2022-01-21 DIAGNOSIS — Z12.12 SCREENING FOR COLORECTAL CANCER: ICD-10-CM

## 2022-01-21 DIAGNOSIS — Z00.00 ANNUAL PHYSICAL EXAM: Primary | ICD-10-CM

## 2022-01-21 DIAGNOSIS — Z12.11 ENCOUNTER FOR SCREENING FOR MALIGNANT NEOPLASM OF COLON: ICD-10-CM

## 2022-01-21 PROBLEM — S83.249A TEAR OF MEDIAL MENISCUS OF KNEE: Status: ACTIVE | Noted: 2022-01-21

## 2022-01-21 PROCEDURE — 99396 PREV VISIT EST AGE 40-64: CPT | Performed by: NURSE PRACTITIONER

## 2022-01-21 RX ORDER — AMLODIPINE BESYLATE 2.5 MG/1
2.5 TABLET ORAL DAILY
Qty: 30 TABLET | Refills: 5 | Status: SHIPPED | OUTPATIENT
Start: 2022-01-21

## 2022-01-21 NOTE — PROGRESS NOTES
BMI Counseling: Body mass index is 35 72 kg/m²  The BMI is above normal  Nutrition recommendations include reducing portion sizes, decreasing overall calorie intake, 3-5 servings of fruits/vegetables daily, reducing fast food intake, consuming healthier snacks, decreasing soda and/or juice intake, moderation in carbohydrate intake, increasing intake of lean protein, reducing intake of saturated fat and trans fat and reducing intake of cholesterol  Exercise recommendations include moderate aerobic physical activity for 150 minutes/week, exercising 3-5 times per week, joining a gym and strength training exercises  [address]    NAME: Chantell Holloway  AGE: 61 y o  SEX: male  : 1965     DATE: 2022     Assessment and Plan:     Problem List Items Addressed This Visit        Cardiovascular and Mediastinum    Hypertension    Relevant Medications    amLODIPine (NORVASC) 2 5 mg tablet    Will get me readings in next 2-3 weeks   If needed will adjust med up       Other Visit Diagnoses     Annual physical exam    -  Primary    Encounter for screening for malignant neoplasm of colon        BMI 35 0-35 9,adult        Screening for colorectal cancer        Relevant Orders    Cologuard    Ventral hernia without obstruction or gangrene          has no insurance at this time      Will got to ER iif pain     Once insurance will let us know and refer to general surgeon   Will get labs then too       Immunizations and preventive care screenings were discussed with patient today  Appropriate education was printed on patient's after visit summary  Counseling:  Alcohol/drug use: discussed moderation in alcohol intake, the recommendations for healthy alcohol use, and avoidance of illicit drug use  Dental Health: discussed importance of regular tooth brushing, flossing, and dental visits    Injury prevention: discussed safety/seat belts, safety helmets, smoke detectors, carbon dioxide detectors, and smoking near bedding or upholstery  Sexual health: discussed sexually transmitted diseases, partner selection, use of condoms, avoidance of unintended pregnancy, and contraceptive alternatives  · Exercise: the importance of regular exercise/physical activity was discussed  Recommend exercise 3-5 times per week for at least 30 minutes  Return in about 1 year (around 1/21/2023) for Annual physical      Chief Complaint:     Chief Complaint   Patient presents with    Annual Exam      History of Present Illness:     Adult Annual Physical   Patient here for a comprehensive physical exam  The patient reports problems - ventral hernia  Diet and Physical Activity  · Diet/Nutrition: well balanced diet and limited fruits/vegetables  · Exercise: 5-7 times a week on average  Depression Screening  PHQ-2/9 Depression Screening    Little interest or pleasure in doing things: 0 - not at all  Feeling down, depressed, or hopeless: 0 - not at all  PHQ-2 Score: 0  PHQ-2 Interpretation: Negative depression screen       General Health  · Sleep: sleeps well  · Hearing: normal - bilateral   · Vision: wears glasses  · Dental: false teeth   Health  · Symptoms include: none     Review of Systems:     Review of Systems   Constitutional: Negative for activity change, appetite change, chills and fever  HENT: Negative for congestion, ear pain, rhinorrhea and sore throat  Eyes: Negative for pain and visual disturbance  Respiratory: Negative for cough, chest tightness and shortness of breath  Cardiovascular: Negative for chest pain and palpitations  Gastrointestinal: Negative for abdominal pain, constipation, diarrhea, nausea and vomiting  Genitourinary: Negative for dysuria, frequency and hematuria  Musculoskeletal: Negative for arthralgias and back pain  Skin: Negative for color change and rash     Neurological: Negative for seizures and syncope  Psychiatric/Behavioral: The patient is not nervous/anxious  All other systems reviewed and are negative  Past Medical History:     No past medical history on file  Past Surgical History:     Past Surgical History:   Procedure Laterality Date    ANTERIOR CRUCIATE LIGAMENT REPAIR      CARPAL TUNNEL RELEASE      ELBOW SURGERY      ULNAR TUNNEL RELEASE        Family History:     Family History   Problem Relation Age of Onset    Pancreatic cancer Father       Social History:     Social History     Socioeconomic History    Marital status: Unknown     Spouse name: None    Number of children: None    Years of education: None    Highest education level: None   Occupational History    None   Tobacco Use    Smoking status: Former Smoker     Types: Cigarettes     Quit date: 2002     Years since quittin 0    Smokeless tobacco: Never Used   Vaping Use    Vaping Use: Never used   Substance and Sexual Activity    Alcohol use:  Yes    Drug use: Yes     Types: Marijuana    Sexual activity: Not Currently   Other Topics Concern    None   Social History Narrative    None     Social Determinants of Health     Financial Resource Strain: Not on file   Food Insecurity: Not on file   Transportation Needs: Not on file   Physical Activity: Not on file   Stress: Not on file   Social Connections: Not on file   Intimate Partner Violence: Not on file   Housing Stability: Not on file      Current Medications:     Current Outpatient Medications   Medication Sig Dispense Refill    FLUoxetine (PROzac) 40 MG capsule TAKE ONE CAPSULE BY MOUTH EVERY MORNING 60 capsule 0    lansoprazole (PREVACID) 30 mg capsule Take 1 capsule (30 mg total) by mouth daily Take before a meal 90 capsule 3    LORazepam (ATIVAN) 1 mg tablet Take 1 tablet (1 mg total) by mouth 2 (two) times a day as needed for anxiety 60 tablet 0    metoprolol tartrate (LOPRESSOR) 50 mg tablet Take 1 tablet (50 mg total) by mouth 2 (two) times a day 180 tablet 0    simvastatin (ZOCOR) 20 mg tablet TAKE ONE TABLET BY MOUTH AT BEDTIME  90 tablet 0    zolpidem (AMBIEN) 10 mg tablet TAKE ONE TABLET BY MOUTH DAILY AS NEEDED for sleep  30 tablet 0    amLODIPine (NORVASC) 2 5 mg tablet Take 1 tablet (2 5 mg total) by mouth daily 30 tablet 5     No current facility-administered medications for this visit  Allergies: Allergies   Allergen Reactions    Sulfa Antibiotics Anaphylaxis    Erythromycin Abdominal Pain    Penicillins       Physical Exam:     /90 (BP Location: Left arm, Patient Position: Sitting, Cuff Size: Large)   Pulse 61   Temp (!) 97 1 °F (36 2 °C) (Tympanic)   Resp 18   Ht 5' 8 5" (1 74 m)   Wt 108 kg (238 lb 6 4 oz)   SpO2 97%   BMI 35 72 kg/m²     Physical Exam  Vitals and nursing note reviewed  Constitutional:       Appearance: Normal appearance  He is well-developed  HENT:      Head: Normocephalic and atraumatic  Right Ear: Tympanic membrane, ear canal and external ear normal       Left Ear: Tympanic membrane, ear canal and external ear normal    Eyes:      Conjunctiva/sclera: Conjunctivae normal       Pupils: Pupils are equal, round, and reactive to light  Cardiovascular:      Rate and Rhythm: Normal rate and regular rhythm  Pulses: Normal pulses  Heart sounds: Normal heart sounds  No murmur heard  Pulmonary:      Effort: Pulmonary effort is normal  No respiratory distress  Breath sounds: Normal breath sounds  Abdominal:      General: Bowel sounds are normal       Palpations: Abdomen is soft  Tenderness: There is no abdominal tenderness  Hernia: A hernia is present  Hernia is present in the ventral area  Comments: Ventral hernia? reducible   Musculoskeletal:      Cervical back: Normal range of motion and neck supple  Skin:     General: Skin is warm and dry  Neurological:      Mental Status: He is alert and oriented to person, place, and time  Psychiatric:         Mood and Affect: Mood normal          Behavior: Behavior normal          Thought Content:  Thought content normal           GO De Paz  [address]

## 2022-01-21 NOTE — PATIENT INSTRUCTIONS

## 2022-02-16 DIAGNOSIS — E78.5 HYPERLIPIDEMIA, UNSPECIFIED HYPERLIPIDEMIA TYPE: ICD-10-CM

## 2022-02-16 RX ORDER — SIMVASTATIN 20 MG
TABLET ORAL
Qty: 90 TABLET | Refills: 0 | Status: SHIPPED | OUTPATIENT
Start: 2022-02-16 | End: 2022-07-05

## 2022-02-19 DIAGNOSIS — I15.9 SECONDARY HYPERTENSION: ICD-10-CM

## 2022-02-21 RX ORDER — METOPROLOL TARTRATE 50 MG/1
TABLET, FILM COATED ORAL
Qty: 180 TABLET | Refills: 0 | Status: SHIPPED | OUTPATIENT
Start: 2022-02-21 | End: 2022-03-11 | Stop reason: SDUPTHER

## 2022-03-11 DIAGNOSIS — I15.9 SECONDARY HYPERTENSION: ICD-10-CM

## 2022-03-11 RX ORDER — METOPROLOL TARTRATE 50 MG/1
TABLET, FILM COATED ORAL
Qty: 180 TABLET | Refills: 0 | Status: SHIPPED | OUTPATIENT
Start: 2022-03-11 | End: 2022-06-06 | Stop reason: SDUPTHER

## 2022-03-17 DIAGNOSIS — F41.9 ANXIETY: ICD-10-CM

## 2022-03-17 RX ORDER — FLUOXETINE HYDROCHLORIDE 40 MG/1
CAPSULE ORAL
Qty: 60 CAPSULE | Refills: 1 | Status: SHIPPED | OUTPATIENT
Start: 2022-03-17

## 2022-06-05 DIAGNOSIS — I15.9 SECONDARY HYPERTENSION: ICD-10-CM

## 2022-06-06 RX ORDER — METOPROLOL TARTRATE 50 MG/1
TABLET, FILM COATED ORAL
Qty: 180 TABLET | Refills: 0 | Status: SHIPPED | OUTPATIENT
Start: 2022-06-06

## 2022-06-22 ENCOUNTER — TELEPHONE (OUTPATIENT)
Dept: ADMINISTRATIVE | Facility: OTHER | Age: 57
End: 2022-06-22

## 2022-06-22 NOTE — TELEPHONE ENCOUNTER
06/22/22 2:06 PM    The patient was called and a message could not be left on the phone number on file/ phone number on file is incorrect  No Medical Communication Consent on File    Thank you    Judith Salas, [address] [place] [address] Rajni Rabago  [facility]

## 2022-07-02 DIAGNOSIS — E78.5 HYPERLIPIDEMIA, UNSPECIFIED HYPERLIPIDEMIA TYPE: ICD-10-CM

## 2022-07-05 RX ORDER — SIMVASTATIN 20 MG
TABLET ORAL
Qty: 90 TABLET | Refills: 0 | Status: SHIPPED | OUTPATIENT
Start: 2022-07-05 | End: 2022-10-07

## 2022-07-19 DIAGNOSIS — F41.9 ANXIETY: ICD-10-CM

## 2022-07-19 RX ORDER — LORAZEPAM 1 MG/1
1 TABLET ORAL 2 TIMES DAILY PRN
Qty: 60 TABLET | Refills: 0 | Status: SHIPPED | OUTPATIENT
Start: 2022-07-19 | End: 2022-10-04

## 2022-08-30 DIAGNOSIS — I10 PRIMARY HYPERTENSION: ICD-10-CM

## 2022-08-30 DIAGNOSIS — G47.9 SLEEPING DIFFICULTIES: ICD-10-CM

## 2022-08-31 RX ORDER — AMLODIPINE BESYLATE 2.5 MG/1
2.5 TABLET ORAL DAILY
Qty: 30 TABLET | Refills: 0 | Status: SHIPPED | OUTPATIENT
Start: 2022-08-31 | End: 2022-09-27

## 2022-08-31 RX ORDER — ZOLPIDEM TARTRATE 10 MG/1
TABLET ORAL
Qty: 30 TABLET | Refills: 0 | Status: SHIPPED | OUTPATIENT
Start: 2022-08-31 | End: 2022-10-04

## 2022-09-23 ENCOUNTER — TELEPHONE (OUTPATIENT)
Dept: FAMILY MEDICINE CLINIC | Facility: CLINIC | Age: 57
End: 2022-09-23

## 2022-09-23 ENCOUNTER — LAB (OUTPATIENT)
Dept: LAB | Facility: CLINIC | Age: 57
End: 2022-09-23
Payer: COMMERCIAL

## 2022-09-23 ENCOUNTER — OFFICE VISIT (OUTPATIENT)
Dept: FAMILY MEDICINE CLINIC | Facility: CLINIC | Age: 57
End: 2022-09-23
Payer: COMMERCIAL

## 2022-09-23 VITALS
WEIGHT: 226.8 LBS | HEART RATE: 61 BPM | BODY MASS INDEX: 33.59 KG/M2 | HEIGHT: 69 IN | OXYGEN SATURATION: 98 % | TEMPERATURE: 97.8 F | DIASTOLIC BLOOD PRESSURE: 88 MMHG | SYSTOLIC BLOOD PRESSURE: 124 MMHG | RESPIRATION RATE: 18 BRPM

## 2022-09-23 DIAGNOSIS — R73.09 ABNORMAL GLUCOSE: ICD-10-CM

## 2022-09-23 DIAGNOSIS — K21.9 GASTROESOPHAGEAL REFLUX DISEASE WITHOUT ESOPHAGITIS: Primary | ICD-10-CM

## 2022-09-23 DIAGNOSIS — E78.5 HYPERLIPIDEMIA, UNSPECIFIED HYPERLIPIDEMIA TYPE: ICD-10-CM

## 2022-09-23 DIAGNOSIS — E29.1 HYPOGONADISM MALE: ICD-10-CM

## 2022-09-23 DIAGNOSIS — K43.9 VENTRAL HERNIA WITHOUT OBSTRUCTION OR GANGRENE: ICD-10-CM

## 2022-09-23 DIAGNOSIS — Z12.11 SCREENING FOR COLON CANCER: ICD-10-CM

## 2022-09-23 DIAGNOSIS — Z12.5 SCREENING FOR PROSTATE CANCER: ICD-10-CM

## 2022-09-23 DIAGNOSIS — I10 PRIMARY HYPERTENSION: ICD-10-CM

## 2022-09-23 LAB
ALBUMIN SERPL BCP-MCNC: 4 G/DL (ref 3.5–5)
ALP SERPL-CCNC: 70 U/L (ref 46–116)
ALT SERPL W P-5'-P-CCNC: 32 U/L (ref 12–78)
ANION GAP SERPL CALCULATED.3IONS-SCNC: 2 MMOL/L (ref 4–13)
AST SERPL W P-5'-P-CCNC: 18 U/L (ref 5–45)
BASOPHILS # BLD AUTO: 0.03 THOUSANDS/ΜL (ref 0–0.1)
BASOPHILS NFR BLD AUTO: 1 % (ref 0–1)
BILIRUB SERPL-MCNC: 0.52 MG/DL (ref 0.2–1)
BUN SERPL-MCNC: 17 MG/DL (ref 5–25)
CALCIUM SERPL-MCNC: 9.6 MG/DL (ref 8.3–10.1)
CHLORIDE SERPL-SCNC: 107 MMOL/L (ref 96–108)
CHOLEST SERPL-MCNC: 174 MG/DL
CO2 SERPL-SCNC: 29 MMOL/L (ref 21–32)
CREAT SERPL-MCNC: 1.21 MG/DL (ref 0.6–1.3)
EOSINOPHIL # BLD AUTO: 0.23 THOUSAND/ΜL (ref 0–0.61)
EOSINOPHIL NFR BLD AUTO: 5 % (ref 0–6)
ERYTHROCYTE [DISTWIDTH] IN BLOOD BY AUTOMATED COUNT: 13.8 % (ref 11.6–15.1)
EST. AVERAGE GLUCOSE BLD GHB EST-MCNC: 126 MG/DL
GFR SERPL CREATININE-BSD FRML MDRD: 66 ML/MIN/1.73SQ M
GLUCOSE P FAST SERPL-MCNC: 110 MG/DL (ref 65–99)
HBA1C MFR BLD: 6 %
HCT VFR BLD AUTO: 48.2 % (ref 36.5–49.3)
HDLC SERPL-MCNC: 40 MG/DL
HGB BLD-MCNC: 15.6 G/DL (ref 12–17)
IMM GRANULOCYTES # BLD AUTO: 0.01 THOUSAND/UL (ref 0–0.2)
IMM GRANULOCYTES NFR BLD AUTO: 0 % (ref 0–2)
LDLC SERPL CALC-MCNC: 89 MG/DL (ref 0–100)
LYMPHOCYTES # BLD AUTO: 1.68 THOUSANDS/ΜL (ref 0.6–4.47)
LYMPHOCYTES NFR BLD AUTO: 35 % (ref 14–44)
MCH RBC QN AUTO: 26.9 PG (ref 26.8–34.3)
MCHC RBC AUTO-ENTMCNC: 32.4 G/DL (ref 31.4–37.4)
MCV RBC AUTO: 83 FL (ref 82–98)
MONOCYTES # BLD AUTO: 0.43 THOUSAND/ΜL (ref 0.17–1.22)
MONOCYTES NFR BLD AUTO: 9 % (ref 4–12)
NEUTROPHILS # BLD AUTO: 2.4 THOUSANDS/ΜL (ref 1.85–7.62)
NEUTS SEG NFR BLD AUTO: 50 % (ref 43–75)
NRBC BLD AUTO-RTO: 0 /100 WBCS
PLATELET # BLD AUTO: 222 THOUSANDS/UL (ref 149–390)
PMV BLD AUTO: 9.1 FL (ref 8.9–12.7)
POTASSIUM SERPL-SCNC: 5.4 MMOL/L (ref 3.5–5.3)
PROT SERPL-MCNC: 7.5 G/DL (ref 6.4–8.4)
PSA SERPL-MCNC: 0.8 NG/ML (ref 0–4)
RBC # BLD AUTO: 5.81 MILLION/UL (ref 3.88–5.62)
SODIUM SERPL-SCNC: 138 MMOL/L (ref 135–147)
TRIGL SERPL-MCNC: 223 MG/DL
TSH SERPL DL<=0.05 MIU/L-ACNC: 1.99 UIU/ML (ref 0.45–4.5)
WBC # BLD AUTO: 4.78 THOUSAND/UL (ref 4.31–10.16)

## 2022-09-23 PROCEDURE — 99396 PREV VISIT EST AGE 40-64: CPT | Performed by: NURSE PRACTITIONER

## 2022-09-23 PROCEDURE — 80053 COMPREHEN METABOLIC PANEL: CPT

## 2022-09-23 PROCEDURE — 3725F SCREEN DEPRESSION PERFORMED: CPT | Performed by: NURSE PRACTITIONER

## 2022-09-23 PROCEDURE — 83036 HEMOGLOBIN GLYCOSYLATED A1C: CPT

## 2022-09-23 PROCEDURE — 84443 ASSAY THYROID STIM HORMONE: CPT

## 2022-09-23 PROCEDURE — 80061 LIPID PANEL: CPT

## 2022-09-23 PROCEDURE — 36415 COLL VENOUS BLD VENIPUNCTURE: CPT

## 2022-09-23 PROCEDURE — G0103 PSA SCREENING: HCPCS

## 2022-09-23 PROCEDURE — 85025 COMPLETE CBC W/AUTO DIFF WBC: CPT

## 2022-09-23 RX ORDER — OMEPRAZOLE 40 MG/1
1 CAPSULE, DELAYED RELEASE ORAL DAILY
COMMUNITY
Start: 2022-09-22 | End: 2022-09-23 | Stop reason: SDUPTHER

## 2022-09-23 RX ORDER — OMEPRAZOLE 40 MG/1
40 CAPSULE, DELAYED RELEASE ORAL DAILY
Qty: 90 CAPSULE | Refills: 3 | Status: SHIPPED | OUTPATIENT
Start: 2022-09-23 | End: 2022-10-27 | Stop reason: SDUPTHER

## 2022-09-23 RX ORDER — DICYCLOMINE HCL 20 MG
1 TABLET ORAL DAILY
COMMUNITY
Start: 2022-09-01

## 2022-09-23 NOTE — PROGRESS NOTES
Assessment/Plan:         Diagnoses and all orders for this visit:    Gastroesophageal reflux disease without esophagitis  -     omeprazole (PriLOSEC) 40 MG capsule; Take 1 capsule (40 mg total) by mouth in the morning  -     Ambulatory Referral to Gastroenterology; Future    BMI 33 0-33 9,adult  -     CBC and differential; Future  -     TSH, 3rd generation with Free T4 reflex; Future    Primary hypertension    Hyperlipidemia, unspecified hyperlipidemia type  -     Lipid Panel with Direct LDL reflex; Future    Screening for colon cancer  -     Ambulatory Referral to Gastroenterology; Future    Abnormal glucose  -     Comprehensive metabolic panel; Future  -     HEMOGLOBIN A1C W/ EAG ESTIMATION; Future    Screening for prostate cancer  -     PSA, Total Screen; Future    Hypogonadism male  -     PSA, Total Screen; Future    Ventral hernia without obstruction or gangrene  -     Ambulatory Referral to General Surgery; Future    Other orders  -     Discontinue: omeprazole (PriLOSEC) 40 MG capsule; Take 1 capsule by mouth in the morning  -     dicyclomine (BENTYL) 20 mg tablet; Take 1 tablet by mouth in the morning  -     ASPIRIN 81 PO; Take 1 tablet by mouth Daily          Subjective:      Patient ID: Luis Felipe El is a 61 y o  male  HPI  Hx of possible ulcer     Was in stressful job and then changed    Better   Needs scoping   The following portions of the patient's history were reviewed and updated as appropriate: allergies, current medications, past family history, past medical history, past social history, past surgical history and problem list     Review of Systems   Constitutional: Negative for activity change, appetite change and fatigue  HENT: Negative for congestion and rhinorrhea  Respiratory: Negative for cough  Gastrointestinal: Negative for constipation, diarrhea and nausea  Genitourinary: Negative for frequency  Musculoskeletal: Positive for arthralgias     Neurological: Negative for dizziness, light-headedness, numbness and headaches  Psychiatric/Behavioral: The patient is not nervous/anxious  Objective:  Vitals:    09/23/22 0655   BP: 124/88   BP Location: Left arm   Patient Position: Sitting   Cuff Size: Large   Pulse: 61   Resp: 18   Temp: 97 8 °F (36 6 °C)   TempSrc: Temporal   SpO2: 98%   Weight: 103 kg (226 lb 12 8 oz)   Height: 5' 8 5" (1 74 m)      Physical Exam  Vitals reviewed  Constitutional:       Appearance: Normal appearance  He is well-developed  HENT:      Head: Normocephalic  Right Ear: Tympanic membrane, ear canal and external ear normal       Left Ear: Tympanic membrane, ear canal and external ear normal       Nose: Nose normal       Mouth/Throat:      Pharynx: Oropharynx is clear  Eyes:      General:         Right eye: No discharge  Left eye: No discharge  Conjunctiva/sclera: Conjunctivae normal       Pupils: Pupils are equal, round, and reactive to light  Neck:      Thyroid: No thyromegaly  Cardiovascular:      Rate and Rhythm: Normal rate and regular rhythm  Pulses: Normal pulses  Heart sounds: Normal heart sounds  No murmur heard  Pulmonary:      Effort: Pulmonary effort is normal       Breath sounds: Normal breath sounds  Abdominal:      General: Bowel sounds are normal       Palpations: Abdomen is soft  Tenderness: There is no abdominal tenderness  Musculoskeletal:         General: Normal range of motion  Cervical back: Normal range of motion and neck supple  Lymphadenopathy:      Cervical: No cervical adenopathy  Skin:     General: Skin is warm  Findings: No rash  Neurological:      General: No focal deficit present  Mental Status: He is alert and oriented to person, place, and time  Psychiatric:         Mood and Affect: Mood normal          Behavior: Behavior normal          Thought Content:  Thought content normal          Judgment: Judgment normal

## 2022-09-23 NOTE — PATIENT INSTRUCTIONS

## 2022-09-23 NOTE — TELEPHONE ENCOUNTER
----- Message from Yeimy Perez, [address] Lewis sent at 9/23/2022 11:48 AM EDT -----  Labs ok   Trig are up     Watch sweets and carbs and ETOH     A1C at 5 which is pre-diabetic

## 2022-09-23 NOTE — PROGRESS NOTES
BMI Counseling: Body mass index is 33 98 kg/m²  The BMI is above normal  Nutrition recommendations include reducing portion sizes, decreasing overall calorie intake, 3-5 servings of fruits/vegetables daily, reducing fast food intake, consuming healthier snacks, decreasing soda and/or juice intake and moderation in carbohydrate intake  Exercise recommendations include exercising 3-5 times per week

## 2022-09-28 ENCOUNTER — OFFICE VISIT (OUTPATIENT)
Dept: SURGERY | Facility: CLINIC | Age: 57
End: 2022-09-28
Payer: COMMERCIAL

## 2022-09-28 VITALS
BODY MASS INDEX: 33.47 KG/M2 | HEIGHT: 69 IN | DIASTOLIC BLOOD PRESSURE: 86 MMHG | TEMPERATURE: 98.2 F | WEIGHT: 226 LBS | OXYGEN SATURATION: 96 % | SYSTOLIC BLOOD PRESSURE: 122 MMHG | HEART RATE: 62 BPM

## 2022-09-28 DIAGNOSIS — K42.9 UMBILICAL HERNIA WITHOUT OBSTRUCTION AND WITHOUT GANGRENE: ICD-10-CM

## 2022-09-28 PROCEDURE — 99244 OFF/OP CNSLTJ NEW/EST MOD 40: CPT | Performed by: SURGERY

## 2022-09-28 NOTE — PROGRESS NOTES
Assessment/Plan:  He has an umbilical hernia that is reducible, no contents  He also has diastasis of his rectus abdominus muscles  The right upper quadrant pain may be secondary to gallstones though as his pain is not well defined have advised him to keep track of when he experiences the pain and if in fact was eating before hand and what he was eating to help narrow down the symptomatology  If it is postprandial pain will order an abdominal ultrasound to check for gallstones  He would like to wait until later in November because of his work schedule to schedule umbilical hernia repair with mesh  Discussed umbilical hernia repair procedure as well as expected postoperative recovery and activity limitations  Risks of procedure were explained including bleeding, infection, risk of recurrence  Informed consent was obtained  He will call the office to schedule  No problem-specific Assessment & Plan notes found for this encounter  Diagnoses and all orders for this visit:    Umbilical hernia without obstruction and without gangrene  -     Ambulatory Referral to General Surgery          Subjective:      Patient ID: Chi Natarajan is a 61 y o  male  He presents with umbilical hernia, noticed by his PCP  He also has right upper quadrant pain that is intermittent, lasts for about 10-15 minutes that he describes as feeling like a side sticker"  He does not recall if it is postprandial but denies any associated nausea or vomiting and pain is not radiate  He has had this pain for about a year  He also reports a mid abdominal bulge, denies any irreducible mass or abdominal pain in this area  A chart review was performed and previous primary care visit notes were reviewed  All applicable imaging studies were reviewed and images were reviewed personally  All applicable laboratory studies were reviewed personally    Care everywhere review was performed if  available and all pertinent notes were reviewed  The following portions of the patient's history were reviewed and updated as appropriate:   He  has no past medical history on file  He   Patient Active Problem List    Diagnosis Date Noted    Tear of medial meniscus of knee 01/21/2022    Insomnia 10/15/2015    Gastroesophageal reflux disease 04/19/2011    Migraine 04/19/2011    Generalized anxiety disorder 08/05/2009    Hyperlipidemia 08/05/2009    Hypertension 08/05/2009     He  has a past surgical history that includes Anterior cruciate ligament repair; Elbow surgery; Carpal tunnel release; and Ulnar tunnel release (2005)  His family history includes Pancreatic cancer in his father  He  reports that he quit smoking about 20 years ago  His smoking use included cigarettes  He has never used smokeless tobacco  He reports previous alcohol use  He reports previous drug use  Drug: Marijuana  Current Outpatient Medications   Medication Sig Dispense Refill    amLODIPine (NORVASC) 2 5 mg tablet TAKE ONE TABLET BY MOUTH ONCE DAILY 90 tablet 1    ASPIRIN 81 PO Take 1 tablet by mouth Daily      dicyclomine (BENTYL) 20 mg tablet Take 1 tablet by mouth in the morning      FLUoxetine (PROzac) 40 MG capsule TAKE ONE CAPSULE BY MOUTH EVERY DAY IN THE MORNING   60 capsule 1    lansoprazole (PREVACID) 30 mg capsule Take 1 capsule (30 mg total) by mouth daily Take before a meal 90 capsule 3    LORazepam (ATIVAN) 1 mg tablet Take 1 tablet (1 mg total) by mouth 2 (two) times a day as needed for anxiety 60 tablet 0    metoprolol tartrate (LOPRESSOR) 50 mg tablet TAKE ONE TABLET BY MOUTH TWICE DAILY 180 tablet 1    omeprazole (PriLOSEC) 40 MG capsule Take 1 capsule (40 mg total) by mouth in the morning 90 capsule 3    simvastatin (ZOCOR) 20 mg tablet TAKE ONE TABLET BY MOUTH NIGHTLY AT BEDTIME 90 tablet 0    zolpidem (AMBIEN) 10 mg tablet TAKE ONE TABLET BY MOUTH EACH DAY AS NEEDED FOR SLEEP 30 tablet 0     No current facility-administered medications for this visit  He is allergic to sulfa antibiotics, erythromycin, and penicillins       Review of Systems   Gastrointestinal: Positive for abdominal pain  Negative for nausea and vomiting  All other systems reviewed and are negative  Objective:      /86 (BP Location: Left arm, Patient Position: Sitting, Cuff Size: Standard)   Pulse 62   Temp 98 2 °F (36 8 °C) (Tympanic)   Ht 5' 8 5" (1 74 m)   Wt 103 kg (226 lb)   SpO2 96%   BMI 33 86 kg/m²          Physical Exam  Vitals reviewed  Constitutional:       General: He is not in acute distress  Appearance: Normal appearance  He is obese  HENT:      Head: Normocephalic and atraumatic  Nose: Nose normal       Mouth/Throat:      Mouth: Mucous membranes are moist       Pharynx: Oropharynx is clear  Eyes:      Extraocular Movements: Extraocular movements intact  Conjunctiva/sclera: Conjunctivae normal       Pupils: Pupils are equal, round, and reactive to light  Cardiovascular:      Rate and Rhythm: Normal rate and regular rhythm  Heart sounds: Normal heart sounds  Pulmonary:      Effort: Pulmonary effort is normal       Breath sounds: Normal breath sounds  Abdominal:      General: Abdomen is flat  There is no distension  Palpations: Abdomen is soft  Tenderness: There is abdominal tenderness in the periumbilical area  There is no guarding or rebound  Hernia: A hernia is present  Comments: Diastasis recti   Musculoskeletal:      Cervical back: Normal range of motion and neck supple  Neurological:      Mental Status: He is alert

## 2022-10-03 DIAGNOSIS — F41.9 ANXIETY: ICD-10-CM

## 2022-10-03 DIAGNOSIS — G47.9 SLEEPING DIFFICULTIES: ICD-10-CM

## 2022-10-04 RX ORDER — LORAZEPAM 1 MG/1
TABLET ORAL
Qty: 60 TABLET | Refills: 0 | Status: SHIPPED | OUTPATIENT
Start: 2022-10-04

## 2022-10-04 RX ORDER — ZOLPIDEM TARTRATE 10 MG/1
TABLET ORAL
Qty: 30 TABLET | Refills: 0 | Status: SHIPPED | OUTPATIENT
Start: 2022-10-04

## 2022-10-07 DIAGNOSIS — E78.5 HYPERLIPIDEMIA, UNSPECIFIED HYPERLIPIDEMIA TYPE: ICD-10-CM

## 2022-10-07 RX ORDER — SIMVASTATIN 20 MG
TABLET ORAL
Qty: 90 TABLET | Refills: 0 | Status: SHIPPED | OUTPATIENT
Start: 2022-10-07

## 2022-10-08 DIAGNOSIS — F41.9 ANXIETY: ICD-10-CM

## 2022-10-11 RX ORDER — FLUOXETINE HYDROCHLORIDE 40 MG/1
CAPSULE ORAL
Qty: 60 CAPSULE | Refills: 0 | Status: SHIPPED | OUTPATIENT
Start: 2022-10-11 | End: 2022-10-27 | Stop reason: SDUPTHER

## 2022-10-27 DIAGNOSIS — Z87.19 H/O GASTROESOPHAGEAL REFLUX (GERD): ICD-10-CM

## 2022-10-27 DIAGNOSIS — F41.9 ANXIETY: ICD-10-CM

## 2022-10-27 DIAGNOSIS — K21.9 GASTROESOPHAGEAL REFLUX DISEASE WITHOUT ESOPHAGITIS: ICD-10-CM

## 2022-10-27 RX ORDER — FLUOXETINE HYDROCHLORIDE 40 MG/1
40 CAPSULE ORAL EVERY MORNING
Qty: 60 CAPSULE | Refills: 0 | Status: SHIPPED | OUTPATIENT
Start: 2022-10-27

## 2022-10-27 RX ORDER — LANSOPRAZOLE 30 MG/1
30 CAPSULE, DELAYED RELEASE ORAL DAILY
Qty: 90 CAPSULE | Refills: 3 | Status: SHIPPED | OUTPATIENT
Start: 2022-10-27

## 2022-10-27 RX ORDER — OMEPRAZOLE 40 MG/1
40 CAPSULE, DELAYED RELEASE ORAL DAILY
Qty: 90 CAPSULE | Refills: 3 | Status: SHIPPED | OUTPATIENT
Start: 2022-10-27

## 2022-11-07 DIAGNOSIS — G47.9 SLEEPING DIFFICULTIES: ICD-10-CM

## 2022-11-08 RX ORDER — ZOLPIDEM TARTRATE 10 MG/1
TABLET ORAL
Qty: 30 TABLET | Refills: 0 | Status: SHIPPED | OUTPATIENT
Start: 2022-11-08

## 2022-12-08 DIAGNOSIS — F41.9 ANXIETY: ICD-10-CM

## 2022-12-08 DIAGNOSIS — G47.9 SLEEPING DIFFICULTIES: ICD-10-CM

## 2022-12-09 RX ORDER — ZOLPIDEM TARTRATE 10 MG/1
TABLET ORAL
Qty: 30 TABLET | Refills: 0 | Status: SHIPPED | OUTPATIENT
Start: 2022-12-09

## 2022-12-09 RX ORDER — LORAZEPAM 1 MG/1
TABLET ORAL
Qty: 60 TABLET | Refills: 0 | Status: SHIPPED | OUTPATIENT
Start: 2022-12-09

## 2023-01-05 DIAGNOSIS — E78.5 HYPERLIPIDEMIA, UNSPECIFIED HYPERLIPIDEMIA TYPE: ICD-10-CM

## 2023-01-05 RX ORDER — SIMVASTATIN 20 MG
TABLET ORAL
Qty: 90 TABLET | Refills: 0 | Status: SHIPPED | OUTPATIENT
Start: 2023-01-05

## 2023-01-10 DIAGNOSIS — G47.9 SLEEPING DIFFICULTIES: ICD-10-CM

## 2023-01-10 DIAGNOSIS — F41.9 ANXIETY: ICD-10-CM

## 2023-01-10 RX ORDER — FLUOXETINE HYDROCHLORIDE 40 MG/1
CAPSULE ORAL
Qty: 60 CAPSULE | Refills: 0 | Status: SHIPPED | OUTPATIENT
Start: 2023-01-10 | End: 2023-01-13

## 2023-01-10 RX ORDER — ZOLPIDEM TARTRATE 10 MG/1
TABLET ORAL
Qty: 30 TABLET | Refills: 0 | Status: SHIPPED | OUTPATIENT
Start: 2023-01-10

## 2023-01-13 DIAGNOSIS — F41.9 ANXIETY: ICD-10-CM

## 2023-01-13 RX ORDER — FLUOXETINE HYDROCHLORIDE 40 MG/1
40 CAPSULE ORAL EVERY MORNING
Qty: 90 CAPSULE | Refills: 0 | Status: SHIPPED | OUTPATIENT
Start: 2023-01-13

## 2023-01-23 DIAGNOSIS — F41.9 ANXIETY: ICD-10-CM

## 2023-01-24 RX ORDER — FLUOXETINE HYDROCHLORIDE 40 MG/1
CAPSULE ORAL
Qty: 90 CAPSULE | Refills: 0 | Status: SHIPPED | OUTPATIENT
Start: 2023-01-24

## 2023-02-09 DIAGNOSIS — F41.9 ANXIETY: ICD-10-CM

## 2023-02-09 DIAGNOSIS — G47.9 SLEEPING DIFFICULTIES: ICD-10-CM

## 2023-02-10 RX ORDER — ZOLPIDEM TARTRATE 10 MG/1
TABLET ORAL
Qty: 30 TABLET | Refills: 0 | Status: SHIPPED | OUTPATIENT
Start: 2023-02-10

## 2023-02-13 RX ORDER — LORAZEPAM 1 MG/1
TABLET ORAL
Qty: 60 TABLET | Refills: 0 | Status: SHIPPED | OUTPATIENT
Start: 2023-02-13

## 2023-03-20 DIAGNOSIS — G47.9 SLEEPING DIFFICULTIES: ICD-10-CM

## 2023-03-21 DIAGNOSIS — I10 PRIMARY HYPERTENSION: ICD-10-CM

## 2023-03-21 DIAGNOSIS — I15.9 SECONDARY HYPERTENSION: ICD-10-CM

## 2023-03-21 RX ORDER — AMLODIPINE BESYLATE 2.5 MG/1
TABLET ORAL
Qty: 90 TABLET | Refills: 0 | Status: SHIPPED | OUTPATIENT
Start: 2023-03-21

## 2023-03-21 RX ORDER — METOPROLOL TARTRATE 50 MG/1
TABLET, FILM COATED ORAL
Qty: 180 TABLET | Refills: 0 | Status: SHIPPED | OUTPATIENT
Start: 2023-03-21

## 2023-03-21 RX ORDER — ZOLPIDEM TARTRATE 10 MG/1
TABLET ORAL
Qty: 30 TABLET | Refills: 0 | Status: SHIPPED | OUTPATIENT
Start: 2023-03-21

## 2023-04-02 DIAGNOSIS — E78.5 HYPERLIPIDEMIA, UNSPECIFIED HYPERLIPIDEMIA TYPE: ICD-10-CM

## 2023-04-03 RX ORDER — SIMVASTATIN 20 MG
TABLET ORAL
Qty: 90 TABLET | Refills: 0 | Status: SHIPPED | OUTPATIENT
Start: 2023-04-03

## 2023-04-05 DIAGNOSIS — F41.9 ANXIETY: ICD-10-CM

## 2023-04-06 RX ORDER — LORAZEPAM 1 MG/1
TABLET ORAL
Qty: 60 TABLET | Refills: 0 | Status: SHIPPED | OUTPATIENT
Start: 2023-04-06

## 2023-05-22 DIAGNOSIS — G47.9 SLEEPING DIFFICULTIES: ICD-10-CM

## 2023-05-23 RX ORDER — ZOLPIDEM TARTRATE 10 MG/1
TABLET ORAL
Qty: 30 TABLET | Refills: 0 | Status: SHIPPED | OUTPATIENT
Start: 2023-05-23

## 2023-06-08 DIAGNOSIS — F41.9 ANXIETY: ICD-10-CM

## 2023-06-09 RX ORDER — LORAZEPAM 1 MG/1
TABLET ORAL
Qty: 60 TABLET | Refills: 0 | Status: SHIPPED | OUTPATIENT
Start: 2023-06-09

## 2023-06-14 DIAGNOSIS — I10 PRIMARY HYPERTENSION: ICD-10-CM

## 2023-06-15 RX ORDER — AMLODIPINE BESYLATE 2.5 MG/1
TABLET ORAL
Qty: 90 TABLET | Refills: 0 | Status: SHIPPED | OUTPATIENT
Start: 2023-06-15

## 2023-06-20 DIAGNOSIS — I15.9 SECONDARY HYPERTENSION: ICD-10-CM

## 2023-06-20 RX ORDER — METOPROLOL TARTRATE 50 MG/1
TABLET, FILM COATED ORAL
Qty: 180 TABLET | Refills: 0 | Status: SHIPPED | OUTPATIENT
Start: 2023-06-20

## 2023-06-27 DIAGNOSIS — G47.9 SLEEPING DIFFICULTIES: ICD-10-CM

## 2023-06-29 RX ORDER — ZOLPIDEM TARTRATE 10 MG/1
TABLET ORAL
Qty: 30 TABLET | Refills: 0 | Status: SHIPPED | OUTPATIENT
Start: 2023-06-29

## 2023-07-13 DIAGNOSIS — E78.5 HYPERLIPIDEMIA, UNSPECIFIED HYPERLIPIDEMIA TYPE: ICD-10-CM

## 2023-07-13 DIAGNOSIS — I10 PRIMARY HYPERTENSION: ICD-10-CM

## 2023-07-13 RX ORDER — SIMVASTATIN 20 MG
20 TABLET ORAL
Qty: 90 TABLET | Refills: 0 | Status: SHIPPED | OUTPATIENT
Start: 2023-07-13

## 2023-07-13 RX ORDER — AMLODIPINE BESYLATE 2.5 MG/1
2.5 TABLET ORAL DAILY
Qty: 90 TABLET | Refills: 0 | Status: SHIPPED | OUTPATIENT
Start: 2023-07-13

## 2023-07-20 DIAGNOSIS — F41.9 ANXIETY: ICD-10-CM

## 2023-07-20 RX ORDER — FLUOXETINE HYDROCHLORIDE 40 MG/1
CAPSULE ORAL
Qty: 90 CAPSULE | Refills: 0 | Status: SHIPPED | OUTPATIENT
Start: 2023-07-20

## 2023-07-26 DIAGNOSIS — G47.9 SLEEPING DIFFICULTIES: ICD-10-CM

## 2023-07-26 DIAGNOSIS — F41.9 ANXIETY: ICD-10-CM

## 2023-07-27 RX ORDER — LORAZEPAM 1 MG/1
TABLET ORAL
Qty: 60 TABLET | Refills: 0 | Status: SHIPPED | OUTPATIENT
Start: 2023-07-27

## 2023-07-27 RX ORDER — ZOLPIDEM TARTRATE 10 MG/1
TABLET ORAL
Qty: 30 TABLET | Refills: 0 | Status: SHIPPED | OUTPATIENT
Start: 2023-07-27

## 2023-08-28 DIAGNOSIS — G47.9 SLEEPING DIFFICULTIES: ICD-10-CM

## 2023-08-29 RX ORDER — ZOLPIDEM TARTRATE 10 MG/1
TABLET ORAL
Qty: 30 TABLET | Refills: 0 | Status: SHIPPED | OUTPATIENT
Start: 2023-08-29

## 2023-09-19 DIAGNOSIS — I15.9 SECONDARY HYPERTENSION: ICD-10-CM

## 2023-09-20 RX ORDER — METOPROLOL TARTRATE 50 MG/1
TABLET, FILM COATED ORAL
Qty: 180 TABLET | Refills: 0 | Status: SHIPPED | OUTPATIENT
Start: 2023-09-20

## 2023-10-05 DIAGNOSIS — G47.9 SLEEPING DIFFICULTIES: ICD-10-CM

## 2023-10-05 DIAGNOSIS — F41.9 ANXIETY: ICD-10-CM

## 2023-10-05 RX ORDER — ZOLPIDEM TARTRATE 10 MG/1
10 TABLET ORAL
Qty: 30 TABLET | Refills: 0 | Status: SHIPPED | OUTPATIENT
Start: 2023-10-05

## 2023-10-05 RX ORDER — LORAZEPAM 1 MG/1
TABLET ORAL
Qty: 60 TABLET | Refills: 0 | Status: SHIPPED | OUTPATIENT
Start: 2023-10-05

## 2023-10-12 DIAGNOSIS — Z87.19 H/O GASTROESOPHAGEAL REFLUX (GERD): ICD-10-CM

## 2023-10-12 DIAGNOSIS — F41.9 ANXIETY: ICD-10-CM

## 2023-10-12 RX ORDER — LANSOPRAZOLE 30 MG/1
CAPSULE, DELAYED RELEASE ORAL
Qty: 90 CAPSULE | Refills: 0 | OUTPATIENT
Start: 2023-10-12

## 2023-10-12 RX ORDER — FLUOXETINE HYDROCHLORIDE 40 MG/1
CAPSULE ORAL
Qty: 90 CAPSULE | Refills: 0 | OUTPATIENT
Start: 2023-10-12

## 2023-10-15 DIAGNOSIS — E78.5 HYPERLIPIDEMIA, UNSPECIFIED HYPERLIPIDEMIA TYPE: ICD-10-CM

## 2023-10-15 DIAGNOSIS — I10 PRIMARY HYPERTENSION: ICD-10-CM

## 2023-10-16 RX ORDER — AMLODIPINE BESYLATE 2.5 MG/1
2.5 TABLET ORAL DAILY
Qty: 90 TABLET | Refills: 0 | OUTPATIENT
Start: 2023-10-16

## 2023-10-16 RX ORDER — SIMVASTATIN 20 MG
20 TABLET ORAL
Qty: 90 TABLET | Refills: 0 | OUTPATIENT
Start: 2023-10-16

## 2023-10-20 DIAGNOSIS — Z87.19 H/O GASTROESOPHAGEAL REFLUX (GERD): ICD-10-CM

## 2023-10-20 DIAGNOSIS — E78.5 HYPERLIPIDEMIA, UNSPECIFIED HYPERLIPIDEMIA TYPE: ICD-10-CM

## 2023-10-23 DIAGNOSIS — Z87.19 H/O GASTROESOPHAGEAL REFLUX (GERD): ICD-10-CM

## 2023-10-23 RX ORDER — SIMVASTATIN 20 MG
20 TABLET ORAL
Qty: 90 TABLET | Refills: 0 | OUTPATIENT
Start: 2023-10-23

## 2023-10-23 RX ORDER — LANSOPRAZOLE 30 MG/1
CAPSULE, DELAYED RELEASE ORAL
Qty: 90 CAPSULE | Refills: 0 | OUTPATIENT
Start: 2023-10-23

## 2023-10-24 RX ORDER — LANSOPRAZOLE 30 MG/1
30 CAPSULE, DELAYED RELEASE ORAL DAILY
Qty: 30 CAPSULE | Refills: 0 | Status: SHIPPED | OUTPATIENT
Start: 2023-10-24

## 2023-10-25 DIAGNOSIS — E78.5 HYPERLIPIDEMIA, UNSPECIFIED HYPERLIPIDEMIA TYPE: ICD-10-CM

## 2023-10-25 RX ORDER — SIMVASTATIN 20 MG
20 TABLET ORAL
Qty: 30 TABLET | Refills: 0 | Status: SHIPPED | OUTPATIENT
Start: 2023-10-25

## 2023-11-10 ENCOUNTER — OFFICE VISIT (OUTPATIENT)
Dept: FAMILY MEDICINE CLINIC | Facility: CLINIC | Age: 58
End: 2023-11-10

## 2023-11-10 VITALS
HEIGHT: 69 IN | TEMPERATURE: 98.2 F | OXYGEN SATURATION: 99 % | WEIGHT: 231.6 LBS | BODY MASS INDEX: 34.3 KG/M2 | HEART RATE: 66 BPM | SYSTOLIC BLOOD PRESSURE: 140 MMHG | DIASTOLIC BLOOD PRESSURE: 92 MMHG

## 2023-11-10 DIAGNOSIS — F41.9 ANXIETY: ICD-10-CM

## 2023-11-10 DIAGNOSIS — Z00.00 ANNUAL PHYSICAL EXAM: Primary | ICD-10-CM

## 2023-11-10 DIAGNOSIS — Z59.89 INSURANCE COVERAGE PROBLEMS: ICD-10-CM

## 2023-11-10 DIAGNOSIS — G47.9 SLEEPING DIFFICULTIES: ICD-10-CM

## 2023-11-10 PROBLEM — Z59.71 INSURANCE COVERAGE PROBLEMS: Status: ACTIVE | Noted: 2023-11-10

## 2023-11-10 PROCEDURE — 99396 PREV VISIT EST AGE 40-64: CPT | Performed by: FAMILY MEDICINE

## 2023-11-10 RX ORDER — ZOLPIDEM TARTRATE 10 MG/1
10 TABLET ORAL
Qty: 90 TABLET | Refills: 0 | Status: SHIPPED | OUTPATIENT
Start: 2023-11-10

## 2023-11-10 RX ORDER — FLUOXETINE HYDROCHLORIDE 40 MG/1
40 CAPSULE ORAL EVERY MORNING
Qty: 90 CAPSULE | Refills: 1 | Status: SHIPPED | OUTPATIENT
Start: 2023-11-10

## 2023-11-10 SDOH — ECONOMIC STABILITY - INCOME SECURITY: OTHER PROBLEMS RELATED TO HOUSING AND ECONOMIC CIRCUMSTANCES: Z59.89

## 2023-11-10 NOTE — PROGRESS NOTES
[address] [facility]    NAME: Yris Philippe  AGE: 60 y.o. SEX: male  : 1965     DATE: 11/10/2023     Assessment and Plan:     Problem List Items Addressed This Visit        Other    Annual physical exam - Primary     Normal annual,  Will wait to do bloodwork until patient gets insurance         Sleeping difficulties     Stable  - refill ambien, PDMP reviewed         Relevant Medications    zolpidem (AMBIEN) 10 mg tablet    Anxiety     Stable  -refill prozac         Relevant Medications    FLUoxetine (PROzac) 40 MG capsule    Insurance coverage problems     No insurance, on unemployment, needs insurance for care         Relevant Orders    Ambulatory Referral to Social Work Care Management Program       Immunizations and preventive care screenings were discussed with patient today. Appropriate education was printed on patient's after visit summary. Discussed risks and benefits of prostate cancer screening. We discussed the controversial history of PSA screening for prostate cancer in the [facility] as well as the risk of over detection and over treatment of prostate cancer by way of PSA screening. The patient understands that PSA blood testing is an imperfect way to screen for prostate cancer and that elevated PSA levels in the blood may also be caused by infection, inflammation, prostatic trauma or manipulation, urological procedures, or by benign prostatic enlargement. The role of the digital rectal examination in prostate cancer screening was also discussed and I discussed with him that there is large interobserver variability in the findings of digital rectal examination. Counseling:  Alcohol/drug use: discussed moderation in alcohol intake, the recommendations for healthy alcohol use, and avoidance of illicit drug use.   Dental Health: discussed importance of regular tooth brushing, flossing, and dental visits. Injury prevention: discussed safety/seat belts, safety helmets, smoke detectors, carbon dioxide detectors, and smoking near bedding or upholstery. Sexual health: discussed sexually transmitted diseases, partner selection, use of condoms, avoidance of unintended pregnancy, and contraceptive alternatives. Exercise: the importance of regular exercise/physical activity was discussed. Recommend exercise 3-5 times per week for at least 30 minutes. BMI Counseling: Body mass index is 34.7 kg/m². The BMI is above normal. Nutrition recommendations include decreasing portion sizes, decreasing fast food intake, limiting drinks that contain sugar and reducing intake of cholesterol. Exercise recommendations include moderate physical activity 150 minutes/week and exercising 3-5 times per week. Rationale for BMI follow-up plan is due to patient being overweight or obese. Depression Screening and Follow-up Plan: Patient was screened for depression during today's encounter. They screened negative with a PHQ-2 score of 1. Return in 6 months (on 5/10/2024). Chief Complaint:     Chief Complaint   Patient presents with   • Annual Exam   • Headache     Back of neck , 2 weeks, no other symptoms       History of Present Illness:     Adult Annual Physical   Patient here for a comprehensive physical exam. The patient reports no problems. Diet and Physical Activity  Diet/Nutrition: well balanced diet and consuming 3-5 servings of fruits/vegetables daily. Exercise: moderate cardiovascular exercise, 5-7 times a week on average, and 1-2 hours on average. Depression Screening  PHQ-2/9 Depression Screening    Little interest or pleasure in doing things: 0 - not at all  Feeling down, depressed, or hopeless: 1 - several days  PHQ-2 Score: 1  PHQ-2 Interpretation: Negative depression screen       General Health  Sleep: sleeps well and gets 7-8 hours of sleep on average.    Hearing: normal - bilateral.  Vision: no vision problems and most recent eye exam <1 year ago. Dental: regular dental visits and brushes teeth twice daily.  Health  Symptoms include: none    Advanced Care Planning  Do you have an advanced directive? no  Do you have a durable medical power of ? no     Review of Systems:     Review of Systems   Constitutional:  Negative for chills and fever. HENT:  Negative for ear pain and sore throat. Eyes:  Negative for pain and visual disturbance. Respiratory:  Negative for cough and shortness of breath. Cardiovascular:  Negative for chest pain and palpitations. Gastrointestinal:  Negative for abdominal pain and vomiting. Genitourinary:  Negative for dysuria and hematuria. Musculoskeletal:  Negative for arthralgias and back pain. Skin:  Negative for color change and rash. Neurological:  Negative for seizures and syncope. All other systems reviewed and are negative. Past Medical History:     History reviewed. No pertinent past medical history.    Past Surgical History:     Past Surgical History:   Procedure Laterality Date   • ANTERIOR CRUCIATE LIGAMENT REPAIR     • CARPAL TUNNEL RELEASE     • ELBOW SURGERY     • ULNAR TUNNEL RELEASE        Family History:     Family History   Problem Relation Age of Onset   • Pancreatic cancer Father       Social History:     Social History     Socioeconomic History   • Marital status: Unknown     Spouse name: None   • Number of children: None   • Years of education: None   • Highest education level: None   Occupational History   • None   Tobacco Use   • Smoking status: Former     Types: Cigarettes     Quit date: 2002     Years since quittin.8   • Smokeless tobacco: Never   Vaping Use   • Vaping Use: Never used   Substance and Sexual Activity   • Alcohol use: Not Currently     Comment: Quit 2022   • Drug use: Not Currently     Types: Marijuana   • Sexual activity: Yes     Partners: Female   Other Topics Concern   • None   Social History Narrative   • None     Social Determinants of Health     Financial Resource Strain: Not on file   Food Insecurity: Not on file   Transportation Needs: Not on file   Physical Activity: Not on file   Stress: Not on file   Social Connections: Not on file   Intimate Partner Violence: Not on file   Housing Stability: Not on file      Current Medications:     Current Outpatient Medications   Medication Sig Dispense Refill   • amLODIPine (NORVASC) 2.5 mg tablet Take 1 tablet (2.5 mg total) by mouth daily 90 tablet 0   • ASPIRIN 81 PO Take 1 tablet by mouth Daily     • dicyclomine (BENTYL) 20 mg tablet Take 1 tablet by mouth in the morning     • FLUoxetine (PROzac) 40 MG capsule Take 1 capsule (40 mg total) by mouth every morning 90 capsule 1   • lansoprazole (PREVACID) 30 mg capsule Take 1 capsule (30 mg total) by mouth daily Take before a meal 30 capsule 0   • LORazepam (ATIVAN) 1 mg tablet TAKE ONE TABLET BY MOUTH TWICE DAILY AS NEEDED FOR ANXIETY 60 tablet 0   • metoprolol tartrate (LOPRESSOR) 50 mg tablet TAKE ONE TABLET BY MOUTH TWICE DAILY 180 tablet 0   • omeprazole (PriLOSEC) 40 MG capsule Take 1 capsule (40 mg total) by mouth in the morning 90 capsule 3   • simvastatin (ZOCOR) 20 mg tablet Take 1 tablet (20 mg total) by mouth daily at bedtime 30 tablet 0   • zolpidem (AMBIEN) 10 mg tablet Take 1 tablet (10 mg total) by mouth daily at bedtime as needed for sleep 90 tablet 0     No current facility-administered medications for this visit. Allergies: Allergies   Allergen Reactions   • Sulfa Antibiotics Anaphylaxis   • Erythromycin Abdominal Pain   • Penicillins       Physical Exam:     /92 (BP Location: Left arm, Patient Position: Sitting, Cuff Size: Adult)   Pulse 66   Temp 98.2 °F (36.8 °C) (Temporal)   Ht 5' 8.5" (1.74 m)   Wt 105 kg (231 lb 9.6 oz)   SpO2 99%   BMI 34.70 kg/m²     Physical Exam  Vitals reviewed. Constitutional:       General: He is not in acute distress. Appearance: Normal appearance. He is well-developed. HENT:      Head: Normocephalic and atraumatic. Nose: Nose normal.      Mouth/Throat:      Mouth: Mucous membranes are moist.      Pharynx: Oropharynx is clear. Eyes:      Conjunctiva/sclera: Conjunctivae normal.   Cardiovascular:      Rate and Rhythm: Normal rate and regular rhythm. Pulses: Normal pulses. Heart sounds: Normal heart sounds. No murmur heard. Pulmonary:      Effort: Pulmonary effort is normal. No respiratory distress. Breath sounds: Normal breath sounds. Abdominal:      Palpations: Abdomen is soft. Tenderness: There is no abdominal tenderness. Musculoskeletal:         General: No swelling or tenderness. Cervical back: Neck supple. Skin:     General: Skin is warm and dry. Capillary Refill: Capillary refill takes less than 2 seconds. Neurological:      Mental Status: He is alert and oriented to person, place, and time.    Psychiatric:         Mood and Affect: Mood normal.          Gracie Martinez, DO  [id number]

## 2023-11-10 NOTE — PATIENT INSTRUCTIONS
Dr. Bennett Wolff at [facility] for headaches/OMT  Wellness Visit for Adults   AMBULATORY CARE:   A wellness visit  is when you see your healthcare provider to get screened for health problems. Your healthcare provider will also give you advice on how to stay healthy. Write down your questions so you remember to ask them. Ask your healthcare provider how often you should have a wellness visit. What happens at a wellness visit:  Your healthcare provider will ask about your health, and your family history of health problems. This includes high blood pressure, heart disease, and cancer. He or she will ask if you have symptoms that concern you, if you smoke, and about your mood. You may also be asked about your intake of medicines, supplements, food, and alcohol. Any of the following may be done: Your weight  will be checked. Your height may also be checked so your body mass index (BMI) can be calculated. Your BMI shows if you are at a healthy weight. Your blood pressure  and heart rate will be checked. Your temperature may also be checked. Blood and urine tests  may be done. Blood tests may be done to check your cholesterol levels. Abnormal cholesterol levels increase your risk for heart disease and stroke. You may also need a blood or urine test to check for diabetes if you are at increased risk. Urine tests may be done to look for signs of an infection or kidney disease. A physical exam  includes checking your heartbeat and lungs with a stethoscope. Your healthcare provider may also check your skin to look for sun damage. Screening tests  may be recommended. A screening test is done to check for diseases that may not cause symptoms. The screening tests you may need depend on your age, gender, family history, and lifestyle habits. For example, colorectal screening may be recommended if you are 48years old or older.     Screening tests you need if you are a woman:   A Pap smear  is used to screen for cervical cancer. Pap smears are usually done every 3 to 5 years depending on your age. You may need them more often if you have had abnormal Pap smear test results in the past. Ask your healthcare provider how often you should have a Pap smear. A mammogram  is an x-ray of your breasts to screen for breast cancer. Experts recommend mammograms every 2 years starting at age 48 years. You may need a mammogram at age 52 years or younger if you have an increased risk for breast cancer. Talk to your healthcare provider about when you should start having mammograms and how often you need them. Vaccines you may need:   Get an influenza vaccine  every year. The influenza vaccine protects you from the flu. Several types of viruses cause the flu. The viruses change over time, so new vaccines are made each year. Get a tetanus-diphtheria (Td) booster vaccine  every 10 years. This vaccine protects you against tetanus and diphtheria. Tetanus is a severe infection that may cause painful muscle spasms and lockjaw. Diphtheria is a severe bacterial infection that causes a thick covering in the back of your mouth and throat. Get a human papillomavirus (HPV) vaccine  if you are female and aged 23 to 32 or male 23 to 24 and never received it. This vaccine protects you from HPV infection. HPV is the most common infection spread by sexual contact. HPV may also cause vaginal, penile, and anal cancers. Get a pneumococcal vaccine  if you are aged 72 years or older. The pneumococcal vaccine is an injection given to protect you from pneumococcal disease. Pneumococcal disease is an infection caused by pneumococcal bacteria. The infection may cause pneumonia, meningitis, or an ear infection. Get a shingles vaccine  if you are 60 or older, even if you have had shingles before. The shingles vaccine is an injection to protect you from the varicella-zoster virus. This is the same virus that causes chickenpox.  Shingles is a painful rash that develops in people who had chickenpox or have been exposed to the virus. How to eat healthy:  My Plate is a model for planning healthy meals. It shows the types and amounts of foods that should go on your plate. Fruits and vegetables make up about half of your plate, and grains and protein make up the other half. A serving of dairy is included on the side of your plate. The amount of calories and serving sizes you need depends on your age, gender, weight, and height. Examples of healthy foods are listed below:  Eat a variety of vegetables  such as dark green, red, and orange vegetables. You can also include canned vegetables low in sodium (salt) and frozen vegetables without added butter or sauces. Eat a variety of fresh fruits , canned fruit in 100% juice, frozen fruit, and dried fruit. Include whole grains. At least half of the grains you eat should be whole grains. Examples include whole-wheat bread, wheat pasta, brown rice, and whole-grain cereals such as oatmeal.    Eat a variety of protein foods such as seafood (fish and shellfish), lean meat, and poultry without skin (turkey and chicken). Examples of lean meats include pork leg, shoulder, or tenderloin, and beef round, sirloin, tenderloin, and extra lean ground beef. Other protein foods include eggs and egg substitutes, beans, peas, soy products, nuts, and seeds. Choose low-fat dairy products such as skim or 1% milk or low-fat yogurt, cheese, and cottage cheese. Limit unhealthy fats  such as butter, hard margarine, and shortening. Exercise:  Exercise at least 30 minutes per day on most days of the week. Some examples of exercise include walking, biking, dancing, and swimming. You can also fit in more physical activity by taking the stairs instead of the elevator or parking farther away from stores. Include muscle strengthening activities 2 days each week. Regular exercise provides many health benefits.  It helps you manage your weight, and decreases your risk for type 2 diabetes, heart disease, stroke, and high blood pressure. Exercise can also help improve your mood. Ask your healthcare provider about the best exercise plan for you. General health and safety guidelines:   Do not smoke. Nicotine and other chemicals in cigarettes and cigars can cause lung damage. Ask your healthcare provider for information if you currently smoke and need help to quit. E-cigarettes or smokeless tobacco still contain nicotine. Talk to your healthcare provider before you use these products. Limit alcohol. A drink of alcohol is 12 ounces of beer, 5 ounces of wine, or 1½ ounces of liquor. Lose weight, if needed. Being overweight increases your risk of certain health conditions. These include heart disease, high blood pressure, type 2 diabetes, and certain types of cancer. Protect your skin. Do not sunbathe or use tanning beds. Use sunscreen with a SPF 15 or higher. Apply sunscreen at least 15 minutes before you go outside. Reapply sunscreen every 2 hours. Wear protective clothing, hats, and sunglasses when you are outside. Drive safely. Always wear your seatbelt. Make sure everyone in your car wears a seatbelt. A seatbelt can save your life if you are in an accident. Do not use your cell phone when you are driving. This could distract you and cause an accident. Pull over if you need to make a call or send a text message. Practice safe sex. Use latex condoms if are sexually active and have more than one partner. Your healthcare provider may recommend screening tests for sexually transmitted infections (STIs). Wear helmets, lifejackets, and protective gear. Always wear a helmet when you ride a bike or motorcycle, go skiing, or play sports that could cause a head injury. Wear protective equipment when you play sports. Wear a lifejacket when you are on a boat or doing water sports.     © Copyright [employer] 2023 Information is for End User's use only and may not be sold, redistributed or otherwise used for commercial purposes. The above information is an  only. It is not intended as medical advice for individual conditions or treatments. Talk to your doctor, nurse or pharmacist before following any medical regimen to see if it is safe and effective for you. Wellness Visit for Adults   AMBULATORY CARE:   A wellness visit  is when you see your healthcare provider to get screened for health problems. Your healthcare provider will also give you advice on how to stay healthy. Write down your questions so you remember to ask them. Ask your healthcare provider how often you should have a wellness visit. What happens at a wellness visit:  Your healthcare provider will ask about your health, and your family history of health problems. This includes high blood pressure, heart disease, and cancer. He or she will ask if you have symptoms that concern you, if you smoke, and about your mood. You may also be asked about your intake of medicines, supplements, food, and alcohol. Any of the following may be done: Your weight  will be checked. Your height may also be checked so your body mass index (BMI) can be calculated. Your BMI shows if you are at a healthy weight. Your blood pressure  and heart rate will be checked. Your temperature may also be checked. Blood and urine tests  may be done. Blood tests may be done to check your cholesterol levels. Abnormal cholesterol levels increase your risk for heart disease and stroke. You may also need a blood or urine test to check for diabetes if you are at increased risk. Urine tests may be done to look for signs of an infection or kidney disease. A physical exam  includes checking your heartbeat and lungs with a stethoscope. Your healthcare provider may also check your skin to look for sun damage. Screening tests  may be recommended.  A screening test is done to check for diseases that may not cause symptoms. The screening tests you may need depend on your age, gender, family history, and lifestyle habits. For example, colorectal screening may be recommended if you are 48years old or older. Screening tests you need if you are a woman:   A Pap smear  is used to screen for cervical cancer. Pap smears are usually done every 3 to 5 years depending on your age. You may need them more often if you have had abnormal Pap smear test results in the past. Ask your healthcare provider how often you should have a Pap smear. A mammogram  is an x-ray of your breasts to screen for breast cancer. Experts recommend mammograms every 2 years starting at age 48 years. You may need a mammogram at age 52 years or younger if you have an increased risk for breast cancer. Talk to your healthcare provider about when you should start having mammograms and how often you need them. Vaccines you may need:   Get an influenza vaccine  every year. The influenza vaccine protects you from the flu. Several types of viruses cause the flu. The viruses change over time, so new vaccines are made each year. Get a tetanus-diphtheria (Td) booster vaccine  every 10 years. This vaccine protects you against tetanus and diphtheria. Tetanus is a severe infection that may cause painful muscle spasms and lockjaw. Diphtheria is a severe bacterial infection that causes a thick covering in the back of your mouth and throat. Get a human papillomavirus (HPV) vaccine  if you are female and aged 23 to 32 or male 23 to 24 and never received it. This vaccine protects you from HPV infection. HPV is the most common infection spread by sexual contact. HPV may also cause vaginal, penile, and anal cancers. Get a pneumococcal vaccine  if you are aged 72 years or older. The pneumococcal vaccine is an injection given to protect you from pneumococcal disease. Pneumococcal disease is an infection caused by pneumococcal bacteria.  The infection may cause pneumonia, meningitis, or an ear infection. Get a shingles vaccine  if you are 60 or older, even if you have had shingles before. The shingles vaccine is an injection to protect you from the varicella-zoster virus. This is the same virus that causes chickenpox. Shingles is a painful rash that develops in people who had chickenpox or have been exposed to the virus. How to eat healthy:  My Plate is a model for planning healthy meals. It shows the types and amounts of foods that should go on your plate. Fruits and vegetables make up about half of your plate, and grains and protein make up the other half. A serving of dairy is included on the side of your plate. The amount of calories and serving sizes you need depends on your age, gender, weight, and height. Examples of healthy foods are listed below:  Eat a variety of vegetables  such as dark green, red, and orange vegetables. You can also include canned vegetables low in sodium (salt) and frozen vegetables without added butter or sauces. Eat a variety of fresh fruits , canned fruit in 100% juice, frozen fruit, and dried fruit. Include whole grains. At least half of the grains you eat should be whole grains. Examples include whole-wheat bread, wheat pasta, brown rice, and whole-grain cereals such as oatmeal.    Eat a variety of protein foods such as seafood (fish and shellfish), lean meat, and poultry without skin (turkey and chicken). Examples of lean meats include pork leg, shoulder, or tenderloin, and beef round, sirloin, tenderloin, and extra lean ground beef. Other protein foods include eggs and egg substitutes, beans, peas, soy products, nuts, and seeds. Choose low-fat dairy products such as skim or 1% milk or low-fat yogurt, cheese, and cottage cheese. Limit unhealthy fats  such as butter, hard margarine, and shortening. Exercise:  Exercise at least 30 minutes per day on most days of the week.  Some examples of exercise include walking, biking, dancing, and swimming. You can also fit in more physical activity by taking the stairs instead of the elevator or parking farther away from stores. Include muscle strengthening activities 2 days each week. Regular exercise provides many health benefits. It helps you manage your weight, and decreases your risk for type 2 diabetes, heart disease, stroke, and high blood pressure. Exercise can also help improve your mood. Ask your healthcare provider about the best exercise plan for you. General health and safety guidelines:   Do not smoke. Nicotine and other chemicals in cigarettes and cigars can cause lung damage. Ask your healthcare provider for information if you currently smoke and need help to quit. E-cigarettes or smokeless tobacco still contain nicotine. Talk to your healthcare provider before you use these products. Limit alcohol. A drink of alcohol is 12 ounces of beer, 5 ounces of wine, or 1½ ounces of liquor. Lose weight, if needed. Being overweight increases your risk of certain health conditions. These include heart disease, high blood pressure, type 2 diabetes, and certain types of cancer. Protect your skin. Do not sunbathe or use tanning beds. Use sunscreen with a SPF 15 or higher. Apply sunscreen at least 15 minutes before you go outside. Reapply sunscreen every 2 hours. Wear protective clothing, hats, and sunglasses when you are outside. Drive safely. Always wear your seatbelt. Make sure everyone in your car wears a seatbelt. A seatbelt can save your life if you are in an accident. Do not use your cell phone when you are driving. This could distract you and cause an accident. Pull over if you need to make a call or send a text message. Practice safe sex. Use latex condoms if are sexually active and have more than one partner. Your healthcare provider may recommend screening tests for sexually transmitted infections (STIs).     Wear helmets, lifejackets, and protective gear. Always wear a helmet when you ride a bike or motorcycle, go skiing, or play sports that could cause a head injury. Wear protective equipment when you play sports. Wear a lifejacket when you are on a boat or doing water sports. © Copyright Chinedu Escamilla 2023 Information is for End User's use only and may not be sold, redistributed or otherwise used for commercial purposes. The above information is an  only. It is not intended as medical advice for individual conditions or treatments. Talk to your doctor, nurse or pharmacist before following any medical regimen to see if it is safe and effective for you.

## 2023-11-13 ENCOUNTER — PATIENT OUTREACH (OUTPATIENT)
Dept: FAMILY MEDICINE CLINIC | Facility: CLINIC | Age: 58
End: 2023-11-13

## 2023-11-13 NOTE — PROGRESS NOTES
RAUL VUONG received referral regarding insurance coverage issues. Patient is on unemployment but needs insurance for care. Bloodwork needed. RAUL VUONG placed call to the patient, [place] and left message. RAUL VUONG will await return call to provide resources and psychosocial support as needed.

## 2023-11-14 ENCOUNTER — PATIENT OUTREACH (OUTPATIENT)
Dept: FAMILY MEDICINE CLINIC | Facility: CLINIC | Age: 58
End: 2023-11-14

## 2023-11-14 DIAGNOSIS — Z59.89 INSURANCE COVERAGE PROBLEMS: Primary | ICD-10-CM

## 2023-11-14 SDOH — ECONOMIC STABILITY - INCOME SECURITY: OTHER PROBLEMS RELATED TO HOUSING AND ECONOMIC CIRCUMSTANCES: Z59.89

## 2023-11-14 NOTE — PROGRESS NOTES
RAUL VUONG received returned call from patient, Marvin Han who advised without insurance. He is unemployed after his company did not have work for him. He currently receives $570/week of layoffs. It is from June and will be out by the end of December. In addition to medical assistance he is also interested in food stamps/SNAP benefits. RAUL VUONG did explain role and scope of CMOC. Patient was receptive to a referral to assist with these applications. Referral placed. RAUL VUONG and Marvin Han discussed financial security. He advissed he is up to date on mortgage but some of his other bills are slipping. His utilities are current. He advised does not need LIHEAP. Regarding his medications he has a 90 day supply. Good Rx brings it all to approximately $100. He has all his medications. He does have a few medical bills. RAUL VUONG advised medical assistance does cover retroactive bills. He drives self to appointments. He denied any further needs at this time. RAUL VUONG will continue to remain available for psychosocial support as needed.

## 2023-11-14 NOTE — PROGRESS NOTES
RAUL CM returned missed call from the patient, Berkley Gallo and left additional message. RAUL CM will await return call to provide resources and psychosocial support as needed.

## 2023-11-17 ENCOUNTER — PATIENT OUTREACH (OUTPATIENT)
Dept: FAMILY MEDICINE CLINIC | Facility: CLINIC | Age: 58
End: 2023-11-17

## 2023-11-17 DIAGNOSIS — Z87.19 H/O GASTROESOPHAGEAL REFLUX (GERD): ICD-10-CM

## 2023-11-17 RX ORDER — LANSOPRAZOLE 30 MG/1
30 CAPSULE, DELAYED RELEASE ORAL DAILY
Qty: 30 CAPSULE | Refills: 0 | Status: SHIPPED | OUTPATIENT
Start: 2023-11-17

## 2023-11-17 NOTE — PROGRESS NOTES
ADDENDUM:     received call back from pt. OC received referral to assist pt with applying for SNAP benefits and MA. Pt stated that he is interested in applying and would like the assistance. Pt is currently receiving unemployment benefits and no other income. Pt does not have any bank accounts and receives he money through a debit card that unemployment provided. Pt did provide an email address so that I can email him the document I will need and will send it to my email. OC will follow up in two weeks.

## 2023-11-19 DIAGNOSIS — E78.5 HYPERLIPIDEMIA, UNSPECIFIED HYPERLIPIDEMIA TYPE: ICD-10-CM

## 2023-11-20 RX ORDER — SIMVASTATIN 20 MG
20 TABLET ORAL
Qty: 30 TABLET | Refills: 0 | Status: SHIPPED | OUTPATIENT
Start: 2023-11-20

## 2023-11-30 ENCOUNTER — PATIENT OUTREACH (OUTPATIENT)
Dept: FAMILY MEDICINE CLINIC | Facility: CLINIC | Age: 58
End: 2023-11-30

## 2023-12-07 ENCOUNTER — PATIENT OUTREACH (OUTPATIENT)
Dept: FAMILY MEDICINE CLINIC | Facility: CLINIC | Age: 58
End: 2023-12-07

## 2023-12-15 ENCOUNTER — PATIENT OUTREACH (OUTPATIENT)
Dept: FAMILY MEDICINE CLINIC | Facility: CLINIC | Age: 58
End: 2023-12-15

## 2023-12-15 NOTE — PROGRESS NOTES
has made several attempts to follow up with pt regarding applying for SNAP and MA benefits. OC left message with my contact information for a call back. If OC does not hear back from pt in one week OC will close case.

## 2023-12-16 DIAGNOSIS — E78.5 HYPERLIPIDEMIA, UNSPECIFIED HYPERLIPIDEMIA TYPE: ICD-10-CM

## 2023-12-16 DIAGNOSIS — Z87.19 H/O GASTROESOPHAGEAL REFLUX (GERD): ICD-10-CM

## 2023-12-18 RX ORDER — SIMVASTATIN 20 MG
20 TABLET ORAL
Qty: 30 TABLET | Refills: 0 | Status: SHIPPED | OUTPATIENT
Start: 2023-12-18

## 2023-12-18 RX ORDER — LANSOPRAZOLE 30 MG/1
30 CAPSULE, DELAYED RELEASE ORAL DAILY
Qty: 30 CAPSULE | Refills: 0 | Status: SHIPPED | OUTPATIENT
Start: 2023-12-18

## 2023-12-20 ENCOUNTER — PATIENT OUTREACH (OUTPATIENT)
Dept: FAMILY MEDICINE CLINIC | Facility: CLINIC | Age: 58
End: 2023-12-20

## 2023-12-21 DIAGNOSIS — I15.9 SECONDARY HYPERTENSION: ICD-10-CM

## 2023-12-21 RX ORDER — METOPROLOL TARTRATE 50 MG/1
TABLET, FILM COATED ORAL
Qty: 180 TABLET | Refills: 0 | Status: SHIPPED | OUTPATIENT
Start: 2023-12-21

## 2023-12-27 ENCOUNTER — PATIENT OUTREACH (OUTPATIENT)
Dept: FAMILY MEDICINE CLINIC | Facility: CLINIC | Age: 58
End: 2023-12-27

## 2023-12-27 NOTE — PROGRESS NOTES
mailed out an unable to reach letter and pt has not responded.    At this time OC will be closing case due to unable to reach.  No further outreach.

## 2023-12-28 ENCOUNTER — PATIENT OUTREACH (OUTPATIENT)
Dept: FAMILY MEDICINE CLINIC | Facility: CLINIC | Age: 58
End: 2023-12-28

## 2023-12-28 NOTE — PROGRESS NOTES
RAUL VUONG received message from CONCETTA Rees indicating that she never received returned call from patient to follow up on SNAP and MA applications.     RAUL CM will close referral due to lost communication with patient. RAUL CM will remain available for psychosocial support as needed.

## 2023-12-29 DIAGNOSIS — F41.9 ANXIETY: ICD-10-CM

## 2023-12-29 RX ORDER — LORAZEPAM 1 MG/1
1 TABLET ORAL 2 TIMES DAILY PRN
Qty: 60 TABLET | Refills: 0 | Status: SHIPPED | OUTPATIENT
Start: 2023-12-29

## 2024-01-09 DIAGNOSIS — K21.9 GASTROESOPHAGEAL REFLUX DISEASE WITHOUT ESOPHAGITIS: ICD-10-CM

## 2024-01-10 RX ORDER — OMEPRAZOLE 40 MG/1
40 CAPSULE, DELAYED RELEASE ORAL EVERY MORNING
Qty: 90 CAPSULE | Refills: 0 | Status: SHIPPED | OUTPATIENT
Start: 2024-01-10

## 2024-01-14 DIAGNOSIS — E78.5 HYPERLIPIDEMIA, UNSPECIFIED HYPERLIPIDEMIA TYPE: ICD-10-CM

## 2024-01-14 DIAGNOSIS — Z87.19 H/O GASTROESOPHAGEAL REFLUX (GERD): ICD-10-CM

## 2024-01-15 RX ORDER — LANSOPRAZOLE 30 MG/1
30 CAPSULE, DELAYED RELEASE ORAL DAILY
Qty: 90 CAPSULE | Refills: 1 | Status: SHIPPED | OUTPATIENT
Start: 2024-01-15

## 2024-01-15 RX ORDER — SIMVASTATIN 20 MG
20 TABLET ORAL
Qty: 90 TABLET | Refills: 0 | Status: SHIPPED | OUTPATIENT
Start: 2024-01-15

## 2024-01-17 DIAGNOSIS — I10 PRIMARY HYPERTENSION: ICD-10-CM

## 2024-01-17 RX ORDER — AMLODIPINE BESYLATE 2.5 MG/1
2.5 TABLET ORAL DAILY
Qty: 90 TABLET | Refills: 0 | Status: SHIPPED | OUTPATIENT
Start: 2024-01-17

## 2024-02-06 DIAGNOSIS — G47.9 SLEEPING DIFFICULTIES: ICD-10-CM

## 2024-02-07 RX ORDER — ZOLPIDEM TARTRATE 10 MG/1
10 TABLET ORAL
Qty: 30 TABLET | Refills: 0 | Status: SHIPPED | OUTPATIENT
Start: 2024-02-07

## 2024-02-19 DIAGNOSIS — F41.9 ANXIETY: ICD-10-CM

## 2024-02-20 RX ORDER — LORAZEPAM 1 MG/1
1 TABLET ORAL 2 TIMES DAILY PRN
Qty: 60 TABLET | Refills: 0 | Status: SHIPPED | OUTPATIENT
Start: 2024-02-20

## 2024-03-13 DIAGNOSIS — G47.9 SLEEPING DIFFICULTIES: ICD-10-CM

## 2024-03-13 RX ORDER — ZOLPIDEM TARTRATE 10 MG/1
10 TABLET ORAL
Qty: 30 TABLET | Refills: 0 | Status: SHIPPED | OUTPATIENT
Start: 2024-03-13

## 2024-03-13 NOTE — TELEPHONE ENCOUNTER
Reason for call:   [x] Refill   [] Prior Auth  [] Other:     Office:   [x] PCP/Provider - Omar  [] Specialty/Provider -     Medication: zolpidem (AMBIEN) 10 mg tablet     Dose/Frequency: 10 mg, Oral, Daily at bedtime PRN     Quantity: 30    Pharmacy: [id number] [place] [address]     Does the patient have enough for 3 days?   [] Yes   [x] No - Send as HP to POD

## 2024-03-15 DIAGNOSIS — I15.9 SECONDARY HYPERTENSION: ICD-10-CM

## 2024-03-15 RX ORDER — METOPROLOL TARTRATE 50 MG/1
TABLET, FILM COATED ORAL
Qty: 180 TABLET | Refills: 1 | Status: SHIPPED | OUTPATIENT
Start: 2024-03-15

## 2024-04-10 DIAGNOSIS — E78.5 HYPERLIPIDEMIA, UNSPECIFIED HYPERLIPIDEMIA TYPE: ICD-10-CM

## 2024-04-10 DIAGNOSIS — G47.9 SLEEPING DIFFICULTIES: ICD-10-CM

## 2024-04-10 DIAGNOSIS — F41.9 ANXIETY: ICD-10-CM

## 2024-04-10 RX ORDER — SIMVASTATIN 20 MG
20 TABLET ORAL
Qty: 90 TABLET | Refills: 0 | Status: SHIPPED | OUTPATIENT
Start: 2024-04-10

## 2024-04-11 RX ORDER — LORAZEPAM 1 MG/1
1 TABLET ORAL 2 TIMES DAILY PRN
Qty: 60 TABLET | Refills: 0 | Status: SHIPPED | OUTPATIENT
Start: 2024-04-11

## 2024-04-11 RX ORDER — ZOLPIDEM TARTRATE 10 MG/1
10 TABLET ORAL
Qty: 30 TABLET | Refills: 0 | Status: SHIPPED | OUTPATIENT
Start: 2024-04-11

## 2024-04-13 DIAGNOSIS — I10 PRIMARY HYPERTENSION: ICD-10-CM

## 2024-04-13 RX ORDER — AMLODIPINE BESYLATE 2.5 MG/1
2.5 TABLET ORAL DAILY
Qty: 90 TABLET | Refills: 0 | Status: SHIPPED | OUTPATIENT
Start: 2024-04-13

## 2024-05-11 DIAGNOSIS — G47.9 SLEEPING DIFFICULTIES: ICD-10-CM

## 2024-05-11 DIAGNOSIS — K21.9 GASTROESOPHAGEAL REFLUX DISEASE WITHOUT ESOPHAGITIS: ICD-10-CM

## 2024-05-13 RX ORDER — OMEPRAZOLE 40 MG/1
40 CAPSULE, DELAYED RELEASE ORAL EVERY MORNING
Qty: 90 CAPSULE | Refills: 0 | Status: SHIPPED | OUTPATIENT
Start: 2024-05-13

## 2024-05-13 RX ORDER — ZOLPIDEM TARTRATE 10 MG/1
10 TABLET ORAL
Qty: 30 TABLET | Refills: 0 | Status: SHIPPED | OUTPATIENT
Start: 2024-05-13

## 2024-05-14 DIAGNOSIS — F41.9 ANXIETY: ICD-10-CM

## 2024-05-14 RX ORDER — FLUOXETINE HYDROCHLORIDE 40 MG/1
40 CAPSULE ORAL EVERY MORNING
Qty: 30 CAPSULE | Refills: 0 | Status: SHIPPED | OUTPATIENT
Start: 2024-05-14

## 2024-06-10 DIAGNOSIS — G47.9 SLEEPING DIFFICULTIES: ICD-10-CM

## 2024-06-11 RX ORDER — ZOLPIDEM TARTRATE 10 MG/1
10 TABLET ORAL
Qty: 30 TABLET | Refills: 0 | Status: SHIPPED | OUTPATIENT
Start: 2024-06-11

## 2024-06-16 DIAGNOSIS — F41.9 ANXIETY: ICD-10-CM

## 2024-06-17 RX ORDER — FLUOXETINE HYDROCHLORIDE 40 MG/1
40 CAPSULE ORAL EVERY MORNING
Qty: 90 CAPSULE | Refills: 0 | Status: SHIPPED | OUTPATIENT
Start: 2024-06-17

## 2024-07-04 DIAGNOSIS — E78.5 HYPERLIPIDEMIA, UNSPECIFIED HYPERLIPIDEMIA TYPE: ICD-10-CM

## 2024-07-05 RX ORDER — SIMVASTATIN 20 MG
20 TABLET ORAL
Qty: 90 TABLET | Refills: 0 | Status: SHIPPED | OUTPATIENT
Start: 2024-07-05

## 2024-07-08 ENCOUNTER — TELEPHONE (OUTPATIENT)
Age: 59
End: 2024-07-08

## 2024-07-08 DIAGNOSIS — R73.09 ABNORMAL GLUCOSE: ICD-10-CM

## 2024-07-08 DIAGNOSIS — Z12.5 SCREENING FOR PROSTATE CANCER: ICD-10-CM

## 2024-07-08 DIAGNOSIS — I50.9 CONGESTIVE HEART FAILURE, UNSPECIFIED HF CHRONICITY, UNSPECIFIED HEART FAILURE TYPE (HCC): Primary | ICD-10-CM

## 2024-07-08 DIAGNOSIS — I10 PRIMARY HYPERTENSION: ICD-10-CM

## 2024-07-08 DIAGNOSIS — E13.9 DIABETES MELLITUS OF OTHER TYPE WITHOUT COMPLICATION, UNSPECIFIED WHETHER LONG TERM INSULIN USE (HCC): ICD-10-CM

## 2024-07-08 DIAGNOSIS — E29.1 HYPOGONADISM MALE: ICD-10-CM

## 2024-07-08 NOTE — TELEPHONE ENCOUNTER
Pt called requesting full panel of lab orders be placed now that he has health insurance (updated in chart) again.      He is asking that it includes testosterone levels.  He also mentioned a separate lab that was done in correlation with the testosterone - stated it had to be done in order for him to receive the injectable.  He mentioned he is currently taking an injectable that he purchases out of pocket.

## 2024-07-09 DIAGNOSIS — G47.9 SLEEPING DIFFICULTIES: ICD-10-CM

## 2024-07-09 DIAGNOSIS — F41.9 ANXIETY: ICD-10-CM

## 2024-07-09 NOTE — TELEPHONE ENCOUNTER
Medication: Lorazepam     Dose/Frequency: 1 mg    Quantity: 60    Pharmacy: [facility] [address]     Office:   [x] PCP/Provider -   [] Speciality/Provider -     Does the patient have enough for 3 days?   [] Yes   [x] No - Send as HP to POD

## 2024-07-10 RX ORDER — LORAZEPAM 1 MG/1
1 TABLET ORAL 2 TIMES DAILY PRN
Qty: 60 TABLET | Refills: 0 | Status: SHIPPED | OUTPATIENT
Start: 2024-07-10

## 2024-07-10 RX ORDER — LORAZEPAM 1 MG/1
1 TABLET ORAL 2 TIMES DAILY PRN
Qty: 60 TABLET | Refills: 0 | OUTPATIENT
Start: 2024-07-10

## 2024-07-10 RX ORDER — ZOLPIDEM TARTRATE 10 MG/1
10 TABLET ORAL
Qty: 30 TABLET | Refills: 0 | Status: SHIPPED | OUTPATIENT
Start: 2024-07-10

## 2024-07-10 NOTE — TELEPHONE ENCOUNTER
Patient called to follow up on this, he is expressing frustration because he is having a rough time with some things right now and really could use the medication.    His initial request on 7/1 was denied, stating too early, no refill until 7/9.  However, he verified with pharmacy and further explained he had not gotten a refill since 4/11 because he was doing okay and didn't need them.  Therefore he didn't understand why he had to wait until 7/9.  He then called yesterday several times, but it is still not being sent.    Could someone please reach out to let him know if this will be sent over soon.  Thank you!

## 2024-07-12 DIAGNOSIS — I10 PRIMARY HYPERTENSION: ICD-10-CM

## 2024-07-12 RX ORDER — AMLODIPINE BESYLATE 2.5 MG/1
2.5 TABLET ORAL DAILY
Qty: 30 TABLET | Refills: 0 | Status: SHIPPED | OUTPATIENT
Start: 2024-07-12

## 2024-07-26 ENCOUNTER — APPOINTMENT (OUTPATIENT)
Dept: LAB | Facility: CLINIC | Age: 59
End: 2024-07-26
Payer: COMMERCIAL

## 2024-07-26 DIAGNOSIS — I10 PRIMARY HYPERTENSION: ICD-10-CM

## 2024-07-26 DIAGNOSIS — E13.9 DIABETES MELLITUS OF OTHER TYPE WITHOUT COMPLICATION, UNSPECIFIED WHETHER LONG TERM INSULIN USE (HCC): ICD-10-CM

## 2024-07-26 DIAGNOSIS — Z12.5 SCREENING FOR PROSTATE CANCER: ICD-10-CM

## 2024-07-26 DIAGNOSIS — R73.09 ABNORMAL GLUCOSE: ICD-10-CM

## 2024-07-26 DIAGNOSIS — I50.9 CONGESTIVE HEART FAILURE, UNSPECIFIED HF CHRONICITY, UNSPECIFIED HEART FAILURE TYPE (HCC): ICD-10-CM

## 2024-07-26 DIAGNOSIS — E29.1 HYPOGONADISM MALE: ICD-10-CM

## 2024-07-26 LAB
ALBUMIN SERPL BCG-MCNC: 4.1 G/DL (ref 3.5–5)
ALP SERPL-CCNC: 49 U/L (ref 34–104)
ALT SERPL W P-5'-P-CCNC: 20 U/L (ref 7–52)
ANION GAP SERPL CALCULATED.3IONS-SCNC: 10 MMOL/L (ref 4–13)
AST SERPL W P-5'-P-CCNC: 22 U/L (ref 13–39)
BASOPHILS # BLD AUTO: 0.03 THOUSANDS/ÂΜL (ref 0–0.1)
BASOPHILS NFR BLD AUTO: 1 % (ref 0–1)
BILIRUB SERPL-MCNC: 0.61 MG/DL (ref 0.2–1)
BUN SERPL-MCNC: 10 MG/DL (ref 5–25)
CALCIUM SERPL-MCNC: 9.3 MG/DL (ref 8.4–10.2)
CHLORIDE SERPL-SCNC: 101 MMOL/L (ref 96–108)
CHOLEST SERPL-MCNC: 153 MG/DL
CO2 SERPL-SCNC: 31 MMOL/L (ref 21–32)
CREAT SERPL-MCNC: 1.01 MG/DL (ref 0.6–1.3)
EOSINOPHIL # BLD AUTO: 0.18 THOUSAND/ÂΜL (ref 0–0.61)
EOSINOPHIL NFR BLD AUTO: 4 % (ref 0–6)
ERYTHROCYTE [DISTWIDTH] IN BLOOD BY AUTOMATED COUNT: 20.8 % (ref 11.6–15.1)
EST. AVERAGE GLUCOSE BLD GHB EST-MCNC: 131 MG/DL
GFR SERPL CREATININE-BSD FRML MDRD: 81 ML/MIN/1.73SQ M
GLUCOSE P FAST SERPL-MCNC: 122 MG/DL (ref 65–99)
HBA1C MFR BLD: 6.2 %
HCT VFR BLD AUTO: 46.8 % (ref 36.5–49.3)
HDLC SERPL-MCNC: 54 MG/DL
HGB BLD-MCNC: 14.3 G/DL (ref 12–17)
IMM GRANULOCYTES # BLD AUTO: 0.01 THOUSAND/UL (ref 0–0.2)
IMM GRANULOCYTES NFR BLD AUTO: 0 % (ref 0–2)
LDLC SERPL CALC-MCNC: 74 MG/DL (ref 0–100)
LYMPHOCYTES # BLD AUTO: 1.91 THOUSANDS/ÂΜL (ref 0.6–4.47)
LYMPHOCYTES NFR BLD AUTO: 38 % (ref 14–44)
MCH RBC QN AUTO: 25 PG (ref 26.8–34.3)
MCHC RBC AUTO-ENTMCNC: 30.6 G/DL (ref 31.4–37.4)
MCV RBC AUTO: 82 FL (ref 82–98)
MONOCYTES # BLD AUTO: 0.61 THOUSAND/ÂΜL (ref 0.17–1.22)
MONOCYTES NFR BLD AUTO: 12 % (ref 4–12)
NEUTROPHILS # BLD AUTO: 2.25 THOUSANDS/ÂΜL (ref 1.85–7.62)
NEUTS SEG NFR BLD AUTO: 45 % (ref 43–75)
NONHDLC SERPL-MCNC: 99 MG/DL
NRBC BLD AUTO-RTO: 0 /100 WBCS
PLATELET # BLD AUTO: 275 THOUSANDS/UL (ref 149–390)
PMV BLD AUTO: 9.1 FL (ref 8.9–12.7)
POTASSIUM SERPL-SCNC: 4.2 MMOL/L (ref 3.5–5.3)
PROT SERPL-MCNC: 6.5 G/DL (ref 6.4–8.4)
PSA SERPL-MCNC: 1.27 NG/ML (ref 0–4)
RBC # BLD AUTO: 5.73 MILLION/UL (ref 3.88–5.62)
SODIUM SERPL-SCNC: 142 MMOL/L (ref 135–147)
TESTOST SERPL-MSCNC: 965 NG/DL
TRIGL SERPL-MCNC: 124 MG/DL
TSH SERPL DL<=0.05 MIU/L-ACNC: 2.11 UIU/ML (ref 0.45–4.5)
WBC # BLD AUTO: 4.99 THOUSAND/UL (ref 4.31–10.16)

## 2024-07-26 PROCEDURE — 84443 ASSAY THYROID STIM HORMONE: CPT

## 2024-07-26 PROCEDURE — 84403 ASSAY OF TOTAL TESTOSTERONE: CPT

## 2024-07-26 PROCEDURE — G0103 PSA SCREENING: HCPCS

## 2024-07-26 PROCEDURE — 36415 COLL VENOUS BLD VENIPUNCTURE: CPT

## 2024-07-26 PROCEDURE — 85025 COMPLETE CBC W/AUTO DIFF WBC: CPT

## 2024-07-26 PROCEDURE — 80053 COMPREHEN METABOLIC PANEL: CPT

## 2024-07-26 PROCEDURE — 80061 LIPID PANEL: CPT

## 2024-07-26 PROCEDURE — 83036 HEMOGLOBIN GLYCOSYLATED A1C: CPT

## 2024-07-31 ENCOUNTER — TELEPHONE (OUTPATIENT)
Age: 59
End: 2024-07-31

## 2024-07-31 NOTE — TELEPHONE ENCOUNTER
Labs reviewed. Looks like his levels are on the border of being anemic but does not have anemia. I would suggest incorporating iron rich foods into the diet like quinoa, red meat, broccoli, shellfish, spinach, etc.

## 2024-07-31 NOTE — TELEPHONE ENCOUNTER
Patient states he looked at his labs and is concerned he is anemic. He is asking if he needs to start iron supplements and what dose he should be taking. Please advise.

## 2024-07-31 NOTE — TELEPHONE ENCOUNTER
Pt called the office back regarding dosage of iron pills. Dosage was verified with pt no further questions.

## 2024-08-05 ENCOUNTER — HOSPITAL ENCOUNTER (EMERGENCY)
Facility: HOSPITAL | Age: 59
Discharge: HOME | End: 2024-08-05
Attending: EMERGENCY MEDICINE
Payer: OTHER MISCELLANEOUS

## 2024-08-05 ENCOUNTER — APPOINTMENT (EMERGENCY)
Dept: RADIOLOGY | Facility: HOSPITAL | Age: 59
End: 2024-08-05
Payer: OTHER MISCELLANEOUS

## 2024-08-05 VITALS
DIASTOLIC BLOOD PRESSURE: 91 MMHG | TEMPERATURE: 97.4 F | OXYGEN SATURATION: 98 % | RESPIRATION RATE: 18 BRPM | HEART RATE: 54 BPM | SYSTOLIC BLOOD PRESSURE: 158 MMHG

## 2024-08-05 DIAGNOSIS — S63.501A SPRAIN OF RIGHT WRIST, INITIAL ENCOUNTER: Primary | ICD-10-CM

## 2024-08-05 DIAGNOSIS — I10 PRIMARY HYPERTENSION: ICD-10-CM

## 2024-08-05 PROCEDURE — 99284 EMERGENCY DEPT VISIT MOD MDM: CPT | Performed by: EMERGENCY MEDICINE

## 2024-08-05 PROCEDURE — 73110 X-RAY EXAM OF WRIST: CPT

## 2024-08-05 PROCEDURE — 99283 EMERGENCY DEPT VISIT LOW MDM: CPT

## 2024-08-05 NOTE — ED PROVIDER NOTES
History  Chief Complaint   Patient presents with    Wrist Pain     Pt was injured R wrist while moving drywall at work.      Patient was putting up drywall 3 days ago when he twisted his right wrist.  Did not land on his wrist or direct blow to the wrist.  No history of previous fractures.  Complains of continued pain.      History provided by:  Patient   used: No    Wrist Pain  Location:  Right wrist  Quality:  Achy  Severity:  Mild  Onset quality:  Gradual  Duration:  3 days  Timing:  Constant  Progression:  Unchanged  Chronicity:  New  Context:  Twisted right wrist 3 days ago.  Complains of pain.  Relieved by:  Nothing  Worsened by:  Nothing  Ineffective treatments:  Wrapping  Associated symptoms: no abdominal pain, no chest pain, no congestion, no cough, no diarrhea, no ear pain, no fever, no headaches, no myalgias, no nausea, no rash, no shortness of breath, no sore throat, no vomiting and no wheezing        Prior to Admission Medications   Prescriptions Last Dose Informant Patient Reported? Taking?   ASPIRIN 81 PO  Self Yes No   Sig: Take 1 tablet by mouth Daily   FLUoxetine (PROzac) 40 MG capsule   No No   Sig: TAKE ONE CAPSULE BY MOUTH EVERY MORNING   LORazepam (ATIVAN) 1 mg tablet   No No   Sig: Take 1 tablet (1 mg total) by mouth 2 (two) times a day as needed for anxiety   amLODIPine (NORVASC) 2.5 mg tablet   No No   Sig: TAKE ONE TABLET BY MOUTH ONCE DAILY   dicyclomine (BENTYL) 20 mg tablet  Self Yes No   Sig: Take 1 tablet by mouth in the morning   lansoprazole (PREVACID) 30 mg capsule   No No   Sig: Take 1 capsule (30 mg total) by mouth daily. Take before a meal.   metoprolol tartrate (LOPRESSOR) 50 mg tablet   No No   Sig: TAKE ONE TABLET BY MOUTH TWICE DAILY   omeprazole (PriLOSEC) 40 MG capsule   No No   Sig: TAKE ONE CAPSULE BY MOUTH IN THE MORNING   simvastatin (ZOCOR) 20 mg tablet   No No   Sig: Take 1 tablet (20 mg total) by mouth daily at bedtime   zolpidem (AMBIEN) 10 mg  tablet   No No   Sig: Take 1 tablet (10 mg total) by mouth daily at bedtime as needed for sleep      Facility-Administered Medications: None       History reviewed. No pertinent past medical history.    Past Surgical History:   Procedure Laterality Date    ANTERIOR CRUCIATE LIGAMENT REPAIR      CARPAL TUNNEL RELEASE      ELBOW SURGERY      ULNAR TUNNEL RELEASE         Family History   Problem Relation Age of Onset    Pancreatic cancer Father      I have reviewed and agree with the history as documented.    E-Cigarette/Vaping    E-Cigarette Use Never User      E-Cigarette/Vaping Substances    Nicotine No     THC No     CBD No     Flavoring No     Other No     Unknown No      Social History     Tobacco Use    Smoking status: Former     Current packs/day: 0.00     Types: Cigarettes     Quit date: 2002     Years since quittin.5    Smokeless tobacco: Never   Vaping Use    Vaping status: Never Used   Substance Use Topics    Alcohol use: Not Currently     Comment: Quit 2022    Drug use: Not Currently     Types: Marijuana       Review of Systems   Constitutional:  Negative for chills and fever.   HENT:  Negative for congestion, ear pain, hearing loss, sore throat, trouble swallowing and voice change.    Eyes:  Negative for pain and discharge.   Respiratory:  Negative for cough, shortness of breath and wheezing.    Cardiovascular:  Negative for chest pain and palpitations.   Gastrointestinal:  Negative for abdominal pain, blood in stool, constipation, diarrhea, nausea and vomiting.   Genitourinary:  Negative for dysuria, flank pain, frequency and hematuria.   Musculoskeletal:  Positive for arthralgias. Negative for joint swelling, myalgias, neck pain and neck stiffness.   Skin:  Negative for rash and wound.   Neurological:  Negative for dizziness, seizures, syncope, facial asymmetry and headaches.   Psychiatric/Behavioral:  Negative for hallucinations, self-injury and suicidal ideas.    All other systems  reviewed and are negative.      Physical Exam  Physical Exam  Vitals and nursing note reviewed.   Constitutional:       General: He is not in acute distress.     Appearance: He is well-developed.   HENT:      Head: Normocephalic and atraumatic.      Right Ear: External ear normal.      Left Ear: External ear normal.   Eyes:      General: No scleral icterus.        Right eye: No discharge.         Left eye: No discharge.      Extraocular Movements: Extraocular movements intact.      Conjunctiva/sclera: Conjunctivae normal.   Cardiovascular:      Rate and Rhythm: Normal rate and regular rhythm.      Heart sounds: Normal heart sounds. No murmur heard.  Pulmonary:      Effort: Pulmonary effort is normal.      Breath sounds: Normal breath sounds. No wheezing or rales.   Abdominal:      General: Bowel sounds are normal. There is no distension.      Palpations: Abdomen is soft.      Tenderness: There is no abdominal tenderness. There is no guarding or rebound.   Musculoskeletal:         General: Tenderness present. No deformity. Normal range of motion.      Cervical back: Normal range of motion and neck supple.      Comments: Right wrist diffusely tender to palpation.  No snuffbox tenderness.  Radial pulse 2+.  Neurovasc intact distally.   Skin:     General: Skin is warm and dry.      Findings: No rash.   Neurological:      General: No focal deficit present.      Mental Status: He is alert and oriented to person, place, and time.      Cranial Nerves: No cranial nerve deficit.   Psychiatric:         Mood and Affect: Mood normal.         Behavior: Behavior normal.         Thought Content: Thought content normal.         Judgment: Judgment normal.         Vital Signs  ED Triage Vitals [08/05/24 1447]   Temperature Pulse Respirations Blood Pressure SpO2   (!) 97.4 °F (36.3 °C) (!) 54 18 158/91 98 %      Temp Source Heart Rate Source Patient Position - Orthostatic VS BP Location FiO2 (%)   Temporal -- -- Left arm --      Pain  Score       8           Vitals:    08/05/24 1447   BP: 158/91   Pulse: (!) 54         Visual Acuity      ED Medications  Medications - No data to display    Diagnostic Studies  Results Reviewed       None                   XR wrist 3+ views RIGHT   ED Interpretation by Omid Stevenson MD (08/05 1323)   No fracture                 Procedures  Splint application    Date/Time: 8/5/2024 3:07 PM    Performed by: Omid Stevenson MD  Authorized by: Omid Stevenson MD  [place] Protocol:  Consent: Verbal consent obtained.  Consent given by: patient  Patient identity confirmed: verbally with patient    Pre-procedure details:     Sensation:  Normal  Procedure details:     Laterality:  Right    Location:  Wrist    Wrist:  R wrist    Strapping: yes  Cast type:     Cast type: Preformed volar splint.  Post-procedure details:     Pain:  Unchanged    Sensation:  Normal    Patient tolerance of procedure:  Tolerated well, no immediate complications           ED Course                                 SBIRT 22yo+      Flowsheet Row Most Recent Value   Initial Alcohol Screen: US AUDIT-C     1. How often do you have a drink containing alcohol? 0 Filed at: 08/05/2024 1454   2. How many drinks containing alcohol do you have on a typical day you are drinking?  0 Filed at: 08/05/2024 1454   3a. Male UNDER 65: How often do you have five or more drinks on one occasion? 0 Filed at: 08/05/2024 1454   Audit-C Score 0 Filed at: 08/05/2024 1454   VICKI: How many times in the past year have you...    Used an illegal drug or used a prescription medication for non-medical reasons? Never Filed at: 08/05/2024 1454                      Medical Decision Making  Amount and/or Complexity of Data Reviewed  Radiology: ordered and independent interpretation performed.                 Disposition  Final diagnoses:   Sprain of right wrist, initial encounter     Time reflects when diagnosis was documented in both MDM as applicable and the Disposition within  this note       Time User Action Codes Description Comment    8/5/2024  3:06 PM Omid Stevenson Add [S63.501A] Sprain of right wrist, initial encounter           ED Disposition       ED Disposition   Discharge    Condition   Stable    Date/Time   Mon Aug 5, 2024 1506    Comment   Roberto Moore discharge to home/self care.                   Follow-up Information       Follow up With Specialties Details Why Contact Info    Bharath Hernandez DO Orthopedic Surgery Call in 1 day  [address]  [address]  [place] [id number] [phone]              Patient's Medications   Discharge Prescriptions    No medications on file       No discharge procedures on file.    PDMP Review         Value Time User    PDMP Reviewed  Yes 7/10/2024 10:33 AM GO [place]            ED Provider  Electronically Signed by             Omid Stevenson MD  08/05/24 1507       Omid Stevenson MD  08/05/24 1508

## 2024-08-06 DIAGNOSIS — Z87.19 H/O GASTROESOPHAGEAL REFLUX (GERD): ICD-10-CM

## 2024-08-06 RX ORDER — AMLODIPINE BESYLATE 2.5 MG/1
2.5 TABLET ORAL DAILY
Qty: 30 TABLET | Refills: 0 | Status: SHIPPED | OUTPATIENT
Start: 2024-08-06

## 2024-08-07 RX ORDER — LANSOPRAZOLE 30 MG/1
30 CAPSULE, DELAYED RELEASE ORAL DAILY
Qty: 90 CAPSULE | Refills: 0 | Status: SHIPPED | OUTPATIENT
Start: 2024-08-07

## 2024-08-12 DIAGNOSIS — G47.9 SLEEPING DIFFICULTIES: ICD-10-CM

## 2024-08-13 DIAGNOSIS — F41.9 ANXIETY: ICD-10-CM

## 2024-08-13 RX ORDER — ZOLPIDEM TARTRATE 10 MG/1
10 TABLET ORAL
Qty: 30 TABLET | Refills: 0 | Status: SHIPPED | OUTPATIENT
Start: 2024-08-13

## 2024-08-14 RX ORDER — LORAZEPAM 1 MG/1
1 TABLET ORAL 2 TIMES DAILY PRN
Qty: 60 TABLET | Refills: 0 | Status: SHIPPED | OUTPATIENT
Start: 2024-08-14

## 2024-08-20 DIAGNOSIS — K21.9 GASTROESOPHAGEAL REFLUX DISEASE WITHOUT ESOPHAGITIS: ICD-10-CM

## 2024-08-21 RX ORDER — OMEPRAZOLE 40 MG/1
40 CAPSULE, DELAYED RELEASE ORAL EVERY MORNING
Qty: 90 CAPSULE | Refills: 0 | Status: SHIPPED | OUTPATIENT
Start: 2024-08-21

## 2024-09-02 DIAGNOSIS — I10 PRIMARY HYPERTENSION: ICD-10-CM

## 2024-09-03 RX ORDER — AMLODIPINE BESYLATE 2.5 MG/1
2.5 TABLET ORAL DAILY
Qty: 90 TABLET | Refills: 0 | Status: SHIPPED | OUTPATIENT
Start: 2024-09-03

## 2024-09-09 DIAGNOSIS — F41.9 ANXIETY: ICD-10-CM

## 2024-09-10 DIAGNOSIS — I15.9 SECONDARY HYPERTENSION: ICD-10-CM

## 2024-09-10 DIAGNOSIS — G47.9 SLEEPING DIFFICULTIES: ICD-10-CM

## 2024-09-10 RX ORDER — FLUOXETINE 40 MG/1
40 CAPSULE ORAL EVERY MORNING
Qty: 90 CAPSULE | Refills: 0 | Status: SHIPPED | OUTPATIENT
Start: 2024-09-10

## 2024-09-10 RX ORDER — METOPROLOL TARTRATE 50 MG
TABLET ORAL
Qty: 60 TABLET | Refills: 0 | Status: SHIPPED | OUTPATIENT
Start: 2024-09-10

## 2024-09-11 RX ORDER — ZOLPIDEM TARTRATE 10 MG/1
10 TABLET ORAL
Qty: 30 TABLET | Refills: 0 | Status: SHIPPED | OUTPATIENT
Start: 2024-09-11

## 2024-09-21 ENCOUNTER — APPOINTMENT (OUTPATIENT)
Dept: URGENT CARE | Facility: MEDICAL CENTER | Age: 59
End: 2024-09-21

## 2024-09-24 DIAGNOSIS — F41.9 ANXIETY: ICD-10-CM

## 2024-09-25 RX ORDER — LORAZEPAM 1 MG/1
1 TABLET ORAL 2 TIMES DAILY PRN
Qty: 60 TABLET | Refills: 0 | Status: SHIPPED | OUTPATIENT
Start: 2024-09-25

## 2024-10-01 DIAGNOSIS — E78.5 HYPERLIPIDEMIA, UNSPECIFIED HYPERLIPIDEMIA TYPE: ICD-10-CM

## 2024-10-01 RX ORDER — SIMVASTATIN 20 MG
20 TABLET ORAL
Qty: 90 TABLET | Refills: 0 | Status: SHIPPED | OUTPATIENT
Start: 2024-10-01

## 2024-10-07 DIAGNOSIS — I15.9 SECONDARY HYPERTENSION: ICD-10-CM

## 2024-10-09 DIAGNOSIS — G47.9 SLEEPING DIFFICULTIES: ICD-10-CM

## 2024-10-09 RX ORDER — METOPROLOL TARTRATE 50 MG
TABLET ORAL
Qty: 180 TABLET | Refills: 0 | Status: SHIPPED | OUTPATIENT
Start: 2024-10-09

## 2024-10-10 RX ORDER — ZOLPIDEM TARTRATE 10 MG/1
TABLET ORAL
Qty: 30 TABLET | Refills: 0 | Status: SHIPPED | OUTPATIENT
Start: 2024-10-10

## 2024-11-01 DIAGNOSIS — Z87.19 H/O GASTROESOPHAGEAL REFLUX (GERD): ICD-10-CM

## 2024-11-01 RX ORDER — LANSOPRAZOLE 30 MG/1
30 CAPSULE, DELAYED RELEASE ORAL DAILY
Qty: 90 CAPSULE | Refills: 1 | Status: SHIPPED | OUTPATIENT
Start: 2024-11-01

## 2024-11-12 DIAGNOSIS — F41.9 ANXIETY: ICD-10-CM

## 2024-11-12 DIAGNOSIS — K21.9 GASTROESOPHAGEAL REFLUX DISEASE WITHOUT ESOPHAGITIS: ICD-10-CM

## 2024-11-12 DIAGNOSIS — G47.9 SLEEPING DIFFICULTIES: ICD-10-CM

## 2024-11-13 RX ORDER — OMEPRAZOLE 40 MG/1
40 CAPSULE, DELAYED RELEASE ORAL EVERY MORNING
Qty: 90 CAPSULE | Refills: 0 | Status: SHIPPED | OUTPATIENT
Start: 2024-11-13

## 2024-11-13 RX ORDER — LORAZEPAM 1 MG/1
1 TABLET ORAL 2 TIMES DAILY PRN
Qty: 60 TABLET | Refills: 0 | Status: SHIPPED | OUTPATIENT
Start: 2024-11-13

## 2024-11-13 RX ORDER — ZOLPIDEM TARTRATE 10 MG/1
TABLET ORAL
Qty: 30 TABLET | Refills: 0 | Status: SHIPPED | OUTPATIENT
Start: 2024-11-13

## 2024-11-13 NOTE — TELEPHONE ENCOUNTER
Only 30 day supply given, patient has not been in the office for over a year and needs a physical. If the patient does not come in for a physical the medication will no longer be filled.

## 2024-11-27 DIAGNOSIS — I10 PRIMARY HYPERTENSION: ICD-10-CM

## 2024-11-27 RX ORDER — AMLODIPINE BESYLATE 2.5 MG/1
2.5 TABLET ORAL DAILY
Qty: 30 TABLET | Refills: 0 | Status: SHIPPED | OUTPATIENT
Start: 2024-11-27

## 2024-12-06 DIAGNOSIS — F41.9 ANXIETY: ICD-10-CM

## 2024-12-06 RX ORDER — FLUOXETINE 40 MG/1
40 CAPSULE ORAL EVERY MORNING
Qty: 30 CAPSULE | Refills: 0 | Status: SHIPPED | OUTPATIENT
Start: 2024-12-06

## 2024-12-11 RX ORDER — MELOXICAM 7.5 MG/1
TABLET ORAL
COMMUNITY
End: 2024-12-12 | Stop reason: SDUPTHER

## 2024-12-12 ENCOUNTER — OFFICE VISIT (OUTPATIENT)
Dept: FAMILY MEDICINE CLINIC | Facility: CLINIC | Age: 59
End: 2024-12-12

## 2024-12-12 VITALS
WEIGHT: 215.6 LBS | OXYGEN SATURATION: 98 % | DIASTOLIC BLOOD PRESSURE: 80 MMHG | SYSTOLIC BLOOD PRESSURE: 132 MMHG | HEART RATE: 77 BPM | BODY MASS INDEX: 32.67 KG/M2 | HEIGHT: 68 IN | TEMPERATURE: 97.7 F

## 2024-12-12 DIAGNOSIS — K42.9 UMBILICAL HERNIA WITHOUT OBSTRUCTION AND WITHOUT GANGRENE: ICD-10-CM

## 2024-12-12 DIAGNOSIS — Z00.00 ANNUAL PHYSICAL EXAM: Primary | ICD-10-CM

## 2024-12-12 DIAGNOSIS — Z12.11 COLON CANCER SCREENING: ICD-10-CM

## 2024-12-12 DIAGNOSIS — R06.83 SNORING: ICD-10-CM

## 2024-12-12 DIAGNOSIS — F41.1 GENERALIZED ANXIETY DISORDER: ICD-10-CM

## 2024-12-12 DIAGNOSIS — I10 PRIMARY HYPERTENSION: ICD-10-CM

## 2024-12-12 DIAGNOSIS — F51.01 PRIMARY INSOMNIA: ICD-10-CM

## 2024-12-12 DIAGNOSIS — M19.90 ARTHRITIS: ICD-10-CM

## 2024-12-12 DIAGNOSIS — R73.03 PREDIABETES: ICD-10-CM

## 2024-12-12 PROBLEM — F41.9 ANXIETY: Status: RESOLVED | Noted: 2023-11-10 | Resolved: 2024-12-12

## 2024-12-12 RX ORDER — ZOLPIDEM TARTRATE 10 MG/1
10 TABLET ORAL
Qty: 30 TABLET | Refills: 0 | Status: SHIPPED | OUTPATIENT
Start: 2024-12-12

## 2024-12-12 RX ORDER — MELOXICAM 7.5 MG/1
7.5 TABLET ORAL DAILY
Qty: 60 TABLET | Refills: 1 | Status: SHIPPED | OUTPATIENT
Start: 2024-12-12

## 2024-12-12 RX ORDER — LORAZEPAM 1 MG/1
1 TABLET ORAL 2 TIMES DAILY PRN
Qty: 60 TABLET | Refills: 0 | Status: SHIPPED | OUTPATIENT
Start: 2024-12-12

## 2024-12-12 NOTE — ASSESSMENT & PLAN NOTE
Lab Results   Component Value Date    HGBA1C 6.2 (H) 07/26/2024   Diet Controlled   Repeat A1c   Orders:  •  Hemoglobin A1C; Future

## 2024-12-12 NOTE — PROGRESS NOTES
Adult Annual Physical  Name: Roberto Moore      : 1965      MRN: [id number]  Encounter Provider: Chay Hidalgo MD  Encounter Date: 2024   Encounter department: [facility]    Assessment & Plan  Annual physical exam       Colon cancer screening    Orders:  •  Ambulatory Referral to Gastroenterology; Future    Primary hypertension  Current BP: Blood Pressure: 132/80   JNC 8 Goal <130/90 for T2DM  Current Medications: Amlodipine 2.5 mg qd and Lopressor 50 mg qd   Blood pressure is controlled on present treatment regimen.  Patient misses no doses and tolerates the meds without side effects.  Patient avoids salt.    PLAN:   - Discussed adhering to htn/dash diet including limiting salt to < 2.4 g daily   - Physical activity at least 4/5x weekly of moderate intense activity of 40 minutes   - Will do kidney function - microalbuminuria/creatinine ratio   - Advised to limit caffeine and alcohol   - No change in present meds. Continue HBPM, advise to bring logs to next visit       Generalized anxiety disorder  Currently on Prozac 40 mg qd and xanax 0.5 mg BID PRN   Stable   Continue current medications  Orders:  •  LORazepam (ATIVAN) 1 mg tablet; Take 1 tablet (1 mg total) by mouth 2 (two) times a day as needed for anxiety    Primary insomnia  Currently on zolpidem 10 mg qd   Stable   Continue   Orders:  •  zolpidem (AMBIEN) 10 mg tablet; Take 1 tablet (10 mg total) by mouth daily at bedtime as needed for sleep    Prediabetes  Lab Results   Component Value Date    HGBA1C 6.2 (H) 2024   Diet Controlled   Repeat A1c   Orders:  •  Hemoglobin A1C; Future    Arthritis    Orders:  •  meloxicam (MOBIC) 7.5 mg tablet; Take 1 tablet (7.5 mg total) by mouth daily    Snoring    Orders:  •  Ambulatory Referral to Sleep Medicine; Future    Umbilical hernia without obstruction and without gangrene    Orders:  •  Ambulatory Referral to General Surgery; Future    Immunizations and  preventive care screenings were discussed with patient today. Appropriate education was printed on patient's after visit summary.    Discussed risks and benefits of prostate cancer screening. We discussed the controversial history of PSA screening for prostate cancer in the [place] as well as the risk of over detection and over treatment of prostate cancer by way of PSA screening.  The patient understands that PSA blood testing is an imperfect way to screen for prostate cancer and that elevated PSA levels in the blood may also be caused by infection, inflammation, prostatic trauma or manipulation, urological procedures, or by benign prostatic enlargement.    The role of the digital rectal examination in prostate cancer screening was also discussed and I discussed with him that there is large interobserver variability in the findings of digital rectal examination.    Counseling:  Alcohol/drug use: discussed moderation in alcohol intake, the recommendations for healthy alcohol use, and avoidance of illicit drug use.  Dental Health: discussed importance of regular tooth brushing, flossing, and dental visits.  Injury prevention: discussed safety/seat belts, safety helmets, smoke detectors, carbon monoxide detectors, and smoking near bedding or upholstery.  Sexual health: discussed sexually transmitted diseases, partner selection, use of condoms, avoidance of unintended pregnancy, and contraceptive alternatives.  Exercise: the importance of regular exercise/physical activity was discussed. Recommend exercise 3-5 times per week for at least 30 minutes.       Depression Screening and Follow-up Plan: Patient was screened for depression during today's encounter. They screened negative with a PHQ-2 score of 0.        History of Present Illness     Adult Annual Physical:  Patient presents for annual physical.     Diet and Physical Activity:  - Diet/Nutrition: well balanced diet, low carb diet and consuming 3-5 servings  of fruits/vegetables daily.  - Exercise: 5-7 times a week on average and more than 2 hours on average.    Depression Screening:  - PHQ-2 Score: 0    General Health:  - Sleep: sleeps well, witnessed apnea and witnessed gasping.  - Vision: most recent eye exam > 1 year ago and vision problems.  - Dental: no dental visits for > 1 year.     Health:  - History of STDs: no.   - Urinary symptoms: none.     Review of Systems   Constitutional:  Negative for chills and fever.   HENT:  Negative for ear pain and sore throat.    Eyes:  Negative for pain and visual disturbance.   Respiratory:  Negative for cough and shortness of breath.    Cardiovascular:  Negative for chest pain and palpitations.   Gastrointestinal:  Negative for abdominal pain and vomiting.   Genitourinary:  Negative for dysuria and hematuria.   Musculoskeletal:  Negative for arthralgias and back pain.   Skin:  Negative for color change and rash.   Neurological:  Negative for seizures and syncope.   All other systems reviewed and are negative.    Current Outpatient Medications on File Prior to Visit   Medication Sig Dispense Refill   • amLODIPine (NORVASC) 2.5 mg tablet TAKE ONE TABLET BY MOUTH ONCE DAILY 30 tablet 0   • ASPIRIN 81 PO Take 1 tablet by mouth Daily     • dicyclomine (BENTYL) 20 mg tablet Take 1 tablet by mouth in the morning     • FLUoxetine (PROzac) 40 MG capsule TAKE ONE CAPSULE BY MOUTH EVERY MORNING 30 capsule 0   • lansoprazole (PREVACID) 30 mg capsule Take 1 capsule (30 mg total) by mouth daily. Take before a meal. 90 capsule 1   • metoprolol tartrate (LOPRESSOR) 50 mg tablet TAKE ONE TABLET BY MOUTH TWICE DAILY 180 tablet 0   • omeprazole (PriLOSEC) 40 MG capsule TAKE ONE CAPSULE BY MOUTH EVERY MORNING 90 capsule 0   • simvastatin (ZOCOR) 20 mg tablet Take 1 tablet (20 mg total) by mouth daily at bedtime 90 tablet 0   • [DISCONTINUED] LORazepam (ATIVAN) 1 mg tablet TAKE ONE TABLET BY MOUTH TWICE DAILY AS NEEDED for ANXIETY 60 tablet 0  "  • [DISCONTINUED] meloxicam (MOBIC) 7.5 mg tablet      • [DISCONTINUED] zolpidem (AMBIEN) 10 mg tablet Take 1 tablet (10 mg total) by mouth daily at bedtime as needed for sleep 30 tablet 0     No current facility-administered medications on file prior to visit.      Social History     Tobacco Use   • Smoking status: Former     Current packs/day: 0.00     Types: Cigarettes     Quit date: 2002     Years since quittin.9     Passive exposure: Past   • Smokeless tobacco: Never   Vaping Use   • Vaping status: Never Used   Substance and Sexual Activity   • Alcohol use: Not Currently     Comment: Quit 2022   • Drug use: Yes     Types: Marijuana   • Sexual activity: Yes     Partners: Female       Objective   /80 (BP Location: Left arm, Patient Position: Sitting, Cuff Size: Adult)   Pulse 77   Temp 97.7 °F (36.5 °C) (Temporal)   Ht 5' 8\" (1.727 m)   Wt 97.8 kg (215 lb 9.6 oz)   SpO2 98%   BMI 32.78 kg/m²     Physical Exam  Vitals and nursing note reviewed.   Constitutional:       General: He is not in acute distress.     Appearance: He is well-developed.   HENT:      Head: Normocephalic and atraumatic.   Eyes:      Conjunctiva/sclera: Conjunctivae normal.   Cardiovascular:      Rate and Rhythm: Normal rate and regular rhythm.      Heart sounds: No murmur heard.  Pulmonary:      Effort: Pulmonary effort is normal. No respiratory distress.      Breath sounds: Normal breath sounds.   Abdominal:      Palpations: Abdomen is soft.      Tenderness: There is no abdominal tenderness.   Musculoskeletal:         General: No swelling.      Cervical back: Neck supple.   Skin:     General: Skin is warm and dry.      Capillary Refill: Capillary refill takes less than 2 seconds.   Neurological:      Mental Status: He is alert.   Psychiatric:         Mood and Affect: Mood normal.         "

## 2024-12-12 NOTE — ASSESSMENT & PLAN NOTE
Currently on Prozac 40 mg qd and xanax 0.5 mg BID PRN   Stable   Continue current medications  Orders:  •  LORazepam (ATIVAN) 1 mg tablet; Take 1 tablet (1 mg total) by mouth 2 (two) times a day as needed for anxiety

## 2024-12-12 NOTE — ASSESSMENT & PLAN NOTE
Current BP: Blood Pressure: 132/80   JNC 8 Goal <130/90 for T2DM  Current Medications: Amlodipine 2.5 mg qd and Lopressor 50 mg qd   Blood pressure is controlled on present treatment regimen.  Patient misses no doses and tolerates the meds without side effects.  Patient avoids salt.    PLAN:   - Discussed adhering to htn/dash diet including limiting salt to < 2.4 g daily   - Physical activity at least 4/5x weekly of moderate intense activity of 40 minutes   - Will do kidney function - microalbuminuria/creatinine ratio   - Advised to limit caffeine and alcohol   - No change in present meds. Continue HBPM, advise to bring logs to next visit

## 2024-12-12 NOTE — ASSESSMENT & PLAN NOTE
Currently on zolpidem 10 mg qd   Stable   Continue   Orders:  •  zolpidem (AMBIEN) 10 mg tablet; Take 1 tablet (10 mg total) by mouth daily at bedtime as needed for sleep

## 2024-12-27 DIAGNOSIS — I10 PRIMARY HYPERTENSION: ICD-10-CM

## 2024-12-27 RX ORDER — AMLODIPINE BESYLATE 2.5 MG/1
2.5 TABLET ORAL DAILY
Qty: 30 TABLET | Refills: 5 | Status: SHIPPED | OUTPATIENT
Start: 2024-12-27

## 2024-12-30 DIAGNOSIS — E78.5 HYPERLIPIDEMIA, UNSPECIFIED HYPERLIPIDEMIA TYPE: ICD-10-CM

## 2024-12-31 RX ORDER — SIMVASTATIN 20 MG
20 TABLET ORAL
Qty: 90 TABLET | Refills: 1 | Status: SHIPPED | OUTPATIENT
Start: 2024-12-31

## 2025-01-02 DIAGNOSIS — I15.9 SECONDARY HYPERTENSION: ICD-10-CM

## 2025-01-03 RX ORDER — METOPROLOL TARTRATE 50 MG
50 TABLET ORAL 2 TIMES DAILY
Qty: 180 TABLET | Refills: 1 | Status: SHIPPED | OUTPATIENT
Start: 2025-01-03

## 2025-01-04 DIAGNOSIS — F41.9 ANXIETY: ICD-10-CM

## 2025-01-05 RX ORDER — FLUOXETINE 40 MG/1
40 CAPSULE ORAL EVERY MORNING
Qty: 30 CAPSULE | Refills: 5 | Status: SHIPPED | OUTPATIENT
Start: 2025-01-05

## 2025-01-10 DIAGNOSIS — F51.01 PRIMARY INSOMNIA: ICD-10-CM

## 2025-01-13 RX ORDER — ZOLPIDEM TARTRATE 10 MG/1
10 TABLET ORAL
Qty: 30 TABLET | Refills: 0 | Status: SHIPPED | OUTPATIENT
Start: 2025-01-13

## 2025-01-15 ENCOUNTER — OFFICE VISIT (OUTPATIENT)
Dept: SURGERY | Facility: CLINIC | Age: 60
End: 2025-01-15
Payer: COMMERCIAL

## 2025-01-15 VITALS
DIASTOLIC BLOOD PRESSURE: 82 MMHG | WEIGHT: 216 LBS | HEART RATE: 52 BPM | TEMPERATURE: 98.3 F | BODY MASS INDEX: 32.74 KG/M2 | OXYGEN SATURATION: 98 % | HEIGHT: 68 IN | SYSTOLIC BLOOD PRESSURE: 132 MMHG

## 2025-01-15 DIAGNOSIS — K42.9 UMBILICAL HERNIA WITHOUT OBSTRUCTION AND WITHOUT GANGRENE: ICD-10-CM

## 2025-01-15 PROCEDURE — 99203 OFFICE O/P NEW LOW 30 MIN: CPT | Performed by: SPECIALIST

## 2025-01-15 RX ORDER — CEFAZOLIN SODIUM 2 G/50ML
2000 SOLUTION INTRAVENOUS ONCE
Status: CANCELLED | OUTPATIENT
Start: 2025-01-23 | End: 2025-01-15

## 2025-01-15 NOTE — H&P (VIEW-ONLY)
xpand All Collapse All  Chief Complaint: Umbilical hernia        History of Present Illness: Patient is a 59-year-old white male who presents the office today with an umbilical hernia.  He was apparently evaluated 3 years ago by Dr. Bryant in regards to this.  There were insurance issues that got in the way of him having this repaired but those issues have improved.  He also weighed about 242 pounds at that time and lost weight and currently weighs 215 pounds.  Diet and exercise.  He presents the office today with Dionne who ran the outpatient labs at at [facility] for over 40 years.     He denies any significant discomfort with the hernia but he says it is getting larger and he would like to have it repaired.  He denies any GI component to his symptoms.     He works construction.        Past Medical History:   Medical History   History reviewed. No pertinent past medical history.     Hypertension, prediabetes, arthritis.     Past Surgical History:    Surgical History         Past Surgical History:   Procedure Laterality Date    ANTERIOR CRUCIATE LIGAMENT REPAIR        CARPAL TUNNEL RELEASE        ELBOW SURGERY        ULNAR TUNNEL RELEASE   2005               Allergies:    Allergies        Allergies   Allergen Reactions    Sulfa Antibiotics Anaphylaxis    Erythromycin Abdominal Pain    Penicillins                 Medications:    Current Medications   Current Outpatient Medications:     amLODIPine (NORVASC) 2.5 mg tablet, TAKE ONE TABLET BY MOUTH ONCE DAILY, Disp: 30 tablet, Rfl: 5    ASPIRIN 81 PO, Take 1 tablet by mouth Daily, Disp: , Rfl:     dicyclomine (BENTYL) 20 mg tablet, Take 1 tablet by mouth in the morning, Disp: , Rfl:     FLUoxetine (PROzac) 40 MG capsule, TAKE ONE CAPSULE BY MOUTH IN THE MORNING, Disp: 30 capsule, Rfl: 5    lansoprazole (PREVACID) 30 mg capsule, Take 1 capsule (30 mg total) by mouth daily. Take before a meal., Disp: 90 capsule, Rfl: 1    LORazepam (ATIVAN) 1 mg tablet,  Take 1 tablet (1 mg total) by mouth 2 (two) times a day as needed for anxiety, Disp: 60 tablet, Rfl: 0    meloxicam (MOBIC) 7.5 mg tablet, Take 1 tablet (7.5 mg total) by mouth daily, Disp: 60 tablet, Rfl: 1    metoprolol tartrate (LOPRESSOR) 50 mg tablet, TAKE ONE TABLET BY MOUTH TWICE DAILY, Disp: 180 tablet, Rfl: 1    omeprazole (PriLOSEC) 40 MG capsule, TAKE ONE CAPSULE BY MOUTH EVERY MORNING, Disp: 90 capsule, Rfl: 0    simvastatin (ZOCOR) 20 mg tablet, Take 1 tablet (20 mg total) by mouth daily at bedtime, Disp: 90 tablet, Rfl: 1    zolpidem (AMBIEN) 10 mg tablet, Take 1 tablet (10 mg total) by mouth daily at bedtime as needed for sleep, Disp: 30 tablet, Rfl: 0           Social History:    Social History[]Expand by Default  Social History           Substance and Sexual Activity   Alcohol Use Not Currently     Comment: Quit 2022      Social History           Substance and Sexual Activity   Drug Use Yes    Types: Marijuana      Tobacco Use History   Social History           Tobacco Use   Smoking Status Former    Current packs/day: 0.00    Types: Cigarettes    Quit date: 2002    Years since quittin.0    Passive exposure: Past   Smokeless Tobacco Never               Family History:    Family History         Family History   Problem Relation Age of Onset    Pancreatic cancer Father                 Review of Systems:    As per the HPI.  Some periumbilical discomfort.  Insomnia.  Some arthralgias.  Otherwise is essentially negative for 12 systems reviewed.     Vitals:      Vitals:     01/15/25 1200   BP: 132/82   Pulse: (!) 52   Temp: 98.3 °F (36.8 °C)   SpO2: 98%         Physical Exam:  Patient is a healthy-appearing middle-aged white male 5 feet 8 inches 216 pounds.  He is awake alert no distress.     Vital signs as above     Skin warm dry  Head normocephalic and atraumatic  Eyes PERRLA EOM intact  Ears and nose within normal limits  Throat gag reflex intact  Neck no masses thyromegaly or lymphadenopathy  palpable.  Back no severe spinal tenderness  Lungs clear to A&P  Cor regular rate and rhythm no murmurs carotid bruits  Abdomen soft, a visible umbilical hernia is present that is reducible.  He has a small diastases recti which is also present.  No masses noted.  Extremities some arthritic changes are noted but negative CCE  Neurologically ANO x 3 cranial nerves II to XII intact  Lymphatics no lymphadenopathy palpable.        Lab Results: I have personally reviewed pertinent reports. See below.  Imaging: I have personally reviewed pertinent reports.   EKG, Pathology, and Other Studies: I have personally reviewed pertinent reports.            No visits with results within 1 Day(s) from this visit.   Latest known visit with results is:   Appointment on 07/26/2024   Component Date Value    WBC 07/26/2024 4.99     RBC 07/26/2024 5.73 (H)     Hemoglobin 07/26/2024 14.3     Hematocrit 07/26/2024 46.8     MCV 07/26/2024 82     MCH 07/26/2024 25.0 (L)     MCHC 07/26/2024 30.6 (L)     RDW 07/26/2024 20.8 (H)     MPV 07/26/2024 9.1     Platelets 07/26/2024 275     nRBC 07/26/2024 0     Segmented % 07/26/2024 45     Immature Grans % 07/26/2024 0     Lymphocytes % 07/26/2024 38     Monocytes % 07/26/2024 12     Eosinophils Relative 07/26/2024 4     Basophils Relative 07/26/2024 1     Absolute Neutrophils 07/26/2024 2.25     Absolute Immature Grans 07/26/2024 0.01     Absolute Lymphocytes 07/26/2024 1.91     Absolute Monocytes 07/26/2024 0.61     Eosinophils Absolute 07/26/2024 0.18     Basophils Absolute 07/26/2024 0.03     Sodium 07/26/2024 142     Potassium 07/26/2024 4.2     Chloride 07/26/2024 101     CO2 07/26/2024 31     ANION GAP 07/26/2024 10     BUN 07/26/2024 10     Creatinine 07/26/2024 1.01     Glucose, Fasting 07/26/2024 122 (H)     Calcium 07/26/2024 9.3     AST 07/26/2024 22     ALT 07/26/2024 20     Alkaline Phosphatase 07/26/2024 49     Total Protein 07/26/2024 6.5     Albumin 07/26/2024 4.1     Total  Bilirubin 07/26/2024 0.61     eGFR 07/26/2024 81     Cholesterol 07/26/2024 153     Triglycerides 07/26/2024 124     HDL, Direct 07/26/2024 54     LDL Calculated 07/26/2024 74     Non-HDL-Chol (CHOL-HDL) 07/26/2024 99     TSH 3RD GENERATON 07/26/2024 2.106     Hemoglobin A1C 07/26/2024 6.2 (H)     EAG 07/26/2024 131     Testosterone 07/26/2024 965 (H)     PSA 07/26/2024 1.272             Impression:  Umbilical hernia.  Request repair and we will oblige him.     Plan:  Repair umbilical hernia under local anesthesia with IV sedation with mesh at the earliest possible date.

## 2025-01-15 NOTE — PROGRESS NOTES
Chief Complaint: Umbilical hernia      History of Present Illness: Patient is a 59-year-old white male who presents the office today with an umbilical hernia.  He was apparently evaluated 3 years ago by Dr. Bryant in regards to this.  There were insurance issues that got in the way of him having this repaired but those issues have improved.  He also weighed about 242 pounds at that time and lost weight and currently weighs 215 pounds.  Diet and exercise.  He presents the office today with Dionne who ran the outpatient labs at at [facility] for over 40 years.    He denies any significant discomfort with the hernia but he says it is getting larger and he would like to have it repaired.  He denies any GI component to his symptoms.    He works construction.      Past Medical History: History reviewed. No pertinent past medical history.  Hypertension, prediabetes, arthritis.    Past Surgical History:    Past Surgical History:   Procedure Laterality Date    ANTERIOR CRUCIATE LIGAMENT REPAIR      CARPAL TUNNEL RELEASE      ELBOW SURGERY      ULNAR TUNNEL RELEASE  2005         Allergies:    Allergies   Allergen Reactions    Sulfa Antibiotics Anaphylaxis    Erythromycin Abdominal Pain    Penicillins          Medications:    Current Outpatient Medications:     amLODIPine (NORVASC) 2.5 mg tablet, TAKE ONE TABLET BY MOUTH ONCE DAILY, Disp: 30 tablet, Rfl: 5    ASPIRIN 81 PO, Take 1 tablet by mouth Daily, Disp: , Rfl:     dicyclomine (BENTYL) 20 mg tablet, Take 1 tablet by mouth in the morning, Disp: , Rfl:     FLUoxetine (PROzac) 40 MG capsule, TAKE ONE CAPSULE BY MOUTH IN THE MORNING, Disp: 30 capsule, Rfl: 5    lansoprazole (PREVACID) 30 mg capsule, Take 1 capsule (30 mg total) by mouth daily. Take before a meal., Disp: 90 capsule, Rfl: 1    LORazepam (ATIVAN) 1 mg tablet, Take 1 tablet (1 mg total) by mouth 2 (two) times a day as needed for anxiety, Disp: 60 tablet, Rfl: 0    meloxicam (MOBIC) 7.5 mg tablet,  Take 1 tablet (7.5 mg total) by mouth daily, Disp: 60 tablet, Rfl: 1    metoprolol tartrate (LOPRESSOR) 50 mg tablet, TAKE ONE TABLET BY MOUTH TWICE DAILY, Disp: 180 tablet, Rfl: 1    omeprazole (PriLOSEC) 40 MG capsule, TAKE ONE CAPSULE BY MOUTH EVERY MORNING, Disp: 90 capsule, Rfl: 0    simvastatin (ZOCOR) 20 mg tablet, Take 1 tablet (20 mg total) by mouth daily at bedtime, Disp: 90 tablet, Rfl: 1    zolpidem (AMBIEN) 10 mg tablet, Take 1 tablet (10 mg total) by mouth daily at bedtime as needed for sleep, Disp: 30 tablet, Rfl: 0      Social History:  Social History     Social History     Substance and Sexual Activity   Alcohol Use Not Currently    Comment: Quit 2022     Social History     Substance and Sexual Activity   Drug Use Yes    Types: Marijuana     Social History     Tobacco Use   Smoking Status Former    Current packs/day: 0.00    Types: Cigarettes    Quit date: 2002    Years since quittin.0    Passive exposure: Past   Smokeless Tobacco Never         Family History:    Family History   Problem Relation Age of Onset    Pancreatic cancer Father          Review of Systems:    As per the HPI.  Some periumbilical discomfort.  Insomnia.  Some arthralgias.  Otherwise is essentially negative for 12 systems reviewed.    Vitals:  Vitals:    01/15/25 1200   BP: 132/82   Pulse: (!) 52   Temp: 98.3 °F (36.8 °C)   SpO2: 98%       Physical Exam:  Patient is a healthy-appearing middle-aged white male 5 feet 8 inches 216 pounds.  He is awake alert no distress.    Vital signs as above    Skin warm dry  Head normocephalic and atraumatic  Eyes PERRLA EOM intact  Ears and nose within normal limits  Throat gag reflex intact  Neck no masses thyromegaly or lymphadenopathy palpable.  Back no severe spinal tenderness  Lungs clear to A&P  Cor regular rate and rhythm no murmurs carotid bruits  Abdomen soft, a visible umbilical hernia is present that is reducible.  He has a small diastases recti which is also present.  No  masses noted.  Extremities some arthritic changes are noted but negative CCE  Neurologically ANO x 3 cranial nerves II to XII intact  Lymphatics no lymphadenopathy palpable.      Lab Results: I have personally reviewed pertinent reports. See below.  Imaging: I have personally reviewed pertinent reports.   EKG, Pathology, and Other Studies: I have personally reviewed pertinent reports.     No visits with results within 1 Day(s) from this visit.   Latest known visit with results is:   Appointment on 07/26/2024   Component Date Value    WBC 07/26/2024 4.99     RBC 07/26/2024 5.73 (H)     Hemoglobin 07/26/2024 14.3     Hematocrit 07/26/2024 46.8     MCV 07/26/2024 82     MCH 07/26/2024 25.0 (L)     MCHC 07/26/2024 30.6 (L)     RDW 07/26/2024 20.8 (H)     MPV 07/26/2024 9.1     Platelets 07/26/2024 275     nRBC 07/26/2024 0     Segmented % 07/26/2024 45     Immature Grans % 07/26/2024 0     Lymphocytes % 07/26/2024 38     Monocytes % 07/26/2024 12     Eosinophils Relative 07/26/2024 4     Basophils Relative 07/26/2024 1     Absolute Neutrophils 07/26/2024 2.25     Absolute Immature Grans 07/26/2024 0.01     Absolute Lymphocytes 07/26/2024 1.91     Absolute Monocytes 07/26/2024 0.61     Eosinophils Absolute 07/26/2024 0.18     Basophils Absolute 07/26/2024 0.03     Sodium 07/26/2024 142     Potassium 07/26/2024 4.2     Chloride 07/26/2024 101     CO2 07/26/2024 31     ANION GAP 07/26/2024 10     BUN 07/26/2024 10     Creatinine 07/26/2024 1.01     Glucose, Fasting 07/26/2024 122 (H)     Calcium 07/26/2024 9.3     AST 07/26/2024 22     ALT 07/26/2024 20     Alkaline Phosphatase 07/26/2024 49     Total Protein 07/26/2024 6.5     Albumin 07/26/2024 4.1     Total Bilirubin 07/26/2024 0.61     eGFR 07/26/2024 81     Cholesterol 07/26/2024 153     Triglycerides 07/26/2024 124     HDL, Direct 07/26/2024 54     LDL Calculated 07/26/2024 74     Non-HDL-Chol (CHOL-HDL) 07/26/2024 99     TSH [address] [place] [address] 07/26/2024 2.106      Hemoglobin A1C 07/26/2024 6.2 (H)     EAG 07/26/2024 131     Testosterone 07/26/2024 965 (H)     PSA 07/26/2024 1.272          Impression:  Umbilical hernia.  Request repair and we will oblige him.    Plan:  Repair umbilical hernia under local anesthesia with IV sedation with mesh at the earliest possible date.

## 2025-01-15 NOTE — LETTER
January 15, 2025     Chay Hidalgo MD  [address] [place] [address]    Patient: Roberto Moore   YOB: 1965   Date of Visit: 1/15/2025       Dear Chay,    Thank you for referring Roberto Moore to me for evaluation. Below are the relevant portions of my H&P.    If you have questions, please do not hesitate to call me. I look forward to following Roberto along with you.         Sincerely,    King Delfin Srinivasan MD        CC: No Recipients    Delfin Srinivasan MD  1/15/2025  5:45 PM  Sign when Signing Visit  xpand All Collapse All  Chief Complaint: Umbilical hernia        History of Present Illness: Patient is a 59-year-old white male who presents the office today with an umbilical hernia.  He was apparently evaluated 3 years ago by Dr. Bryant in regards to this.  There were insurance issues that got in the way of him having this repaired but those issues have improved.  He also weighed about 242 pounds at that time and lost weight and currently weighs 215 pounds.  Diet and exercise.  He presents the office today with Dionne who ran the outpatient labs at at [facility] for over 40 years.     He denies any significant discomfort with the hernia but he says it is getting larger and he would like to have it repaired.  He denies any GI component to his symptoms.     He works construction.        Past Medical History:   Medical History   History reviewed. No pertinent past medical history.     Hypertension, prediabetes, arthritis.     Past Surgical History:    Surgical History         Past Surgical History:   Procedure Laterality Date   • ANTERIOR CRUCIATE LIGAMENT REPAIR       • CARPAL TUNNEL RELEASE       • ELBOW SURGERY       • ULNAR TUNNEL RELEASE   2005               Allergies:    Allergies        Allergies   Allergen Reactions   • Sulfa Antibiotics Anaphylaxis   • Erythromycin Abdominal Pain   • Penicillins                 Medications:    Current Medications   Current  Outpatient Medications:   •  amLODIPine (NORVASC) 2.5 mg tablet, TAKE ONE TABLET BY MOUTH ONCE DAILY, Disp: 30 tablet, Rfl: 5  •  ASPIRIN 81 PO, Take 1 tablet by mouth Daily, Disp: , Rfl:   •  dicyclomine (BENTYL) 20 mg tablet, Take 1 tablet by mouth in the morning, Disp: , Rfl:   •  FLUoxetine (PROzac) 40 MG capsule, TAKE ONE CAPSULE BY MOUTH IN THE MORNING, Disp: 30 capsule, Rfl: 5  •  lansoprazole (PREVACID) 30 mg capsule, Take 1 capsule (30 mg total) by mouth daily. Take before a meal., Disp: 90 capsule, Rfl: 1  •  LORazepam (ATIVAN) 1 mg tablet, Take 1 tablet (1 mg total) by mouth 2 (two) times a day as needed for anxiety, Disp: 60 tablet, Rfl: 0  •  meloxicam (MOBIC) 7.5 mg tablet, Take 1 tablet (7.5 mg total) by mouth daily, Disp: 60 tablet, Rfl: 1  •  metoprolol tartrate (LOPRESSOR) 50 mg tablet, TAKE ONE TABLET BY MOUTH TWICE DAILY, Disp: 180 tablet, Rfl: 1  •  omeprazole (PriLOSEC) 40 MG capsule, TAKE ONE CAPSULE BY MOUTH EVERY MORNING, Disp: 90 capsule, Rfl: 0  •  simvastatin (ZOCOR) 20 mg tablet, Take 1 tablet (20 mg total) by mouth daily at bedtime, Disp: 90 tablet, Rfl: 1  •  zolpidem (AMBIEN) 10 mg tablet, Take 1 tablet (10 mg total) by mouth daily at bedtime as needed for sleep, Disp: 30 tablet, Rfl: 0           Social History:    Social History[]Expand by Default  Social History           Substance and Sexual Activity   Alcohol Use Not Currently     Comment: Quit 2022      Social History           Substance and Sexual Activity   Drug Use Yes   • Types: Marijuana      Tobacco Use History   Social History           Tobacco Use   Smoking Status Former   • Current packs/day: 0.00   • Types: Cigarettes   • Quit date: 2002   • Years since quittin.0   • Passive exposure: Past   Smokeless Tobacco Never               Family History:    Family History         Family History   Problem Relation Age of Onset   • Pancreatic cancer Father                 Review of Systems:    As per the HPI.  Some  periumbilical discomfort.  Insomnia.  Some arthralgias.  Otherwise is essentially negative for 12 systems reviewed.     Vitals:      Vitals:     01/15/25 1200   BP: 132/82   Pulse: (!) 52   Temp: 98.3 °F (36.8 °C)   SpO2: 98%         Physical Exam:  Patient is a healthy-appearing middle-aged white male 5 feet 8 inches 216 pounds.  He is awake alert no distress.     Vital signs as above     Skin warm dry  Head normocephalic and atraumatic  Eyes PERRLA EOM intact  Ears and nose within normal limits  Throat gag reflex intact  Neck no masses thyromegaly or lymphadenopathy palpable.  Back no severe spinal tenderness  Lungs clear to A&P  Cor regular rate and rhythm no murmurs carotid bruits  Abdomen soft, a visible umbilical hernia is present that is reducible.  He has a small diastases recti which is also present.  No masses noted.  Extremities some arthritic changes are noted but negative CCE  Neurologically ANO x 3 cranial nerves II to XII intact  Lymphatics no lymphadenopathy palpable.        Lab Results: I have personally reviewed pertinent reports. See below.  Imaging: I have personally reviewed pertinent reports.   EKG, Pathology, and Other Studies: I have personally reviewed pertinent reports.            No visits with results within 1 Day(s) from this visit.   Latest known visit with results is:   Appointment on 07/26/2024   Component Date Value   • WBC 07/26/2024 4.99    • RBC 07/26/2024 5.73 (H)    • Hemoglobin 07/26/2024 14.3    • Hematocrit 07/26/2024 46.8    • MCV 07/26/2024 82    • MCH 07/26/2024 25.0 (L)    • MCHC 07/26/2024 30.6 (L)    • RDW 07/26/2024 20.8 (H)    • MPV 07/26/2024 9.1    • Platelets 07/26/2024 275    • nRBC 07/26/2024 0    • Segmented % 07/26/2024 45    • Immature Grans % 07/26/2024 0    • Lymphocytes % 07/26/2024 38    • Monocytes % 07/26/2024 12    • Eosinophils Relative 07/26/2024 4    • Basophils Relative 07/26/2024 1    • Absolute Neutrophils 07/26/2024 2.25    • Absolute Immature  Grans 07/26/2024 0.01    • Absolute Lymphocytes 07/26/2024 1.91    • Absolute Monocytes 07/26/2024 0.61    • Eosinophils Absolute 07/26/2024 0.18    • Basophils Absolute 07/26/2024 0.03    • Sodium 07/26/2024 142    • Potassium 07/26/2024 4.2    • Chloride 07/26/2024 101    • CO2 07/26/2024 31    • ANION GAP 07/26/2024 10    • BUN 07/26/2024 10    • Creatinine 07/26/2024 1.01    • Glucose, Fasting 07/26/2024 122 (H)    • Calcium 07/26/2024 9.3    • AST 07/26/2024 22    • ALT 07/26/2024 20    • Alkaline Phosphatase 07/26/2024 49    • Total Protein 07/26/2024 6.5    • Albumin 07/26/2024 4.1    • Total Bilirubin 07/26/2024 0.61    • eGFR 07/26/2024 81    • Cholesterol 07/26/2024 153    • Triglycerides 07/26/2024 124    • HDL, Direct 07/26/2024 54    • LDL Calculated 07/26/2024 74    • Non-HDL-Chol (CHOL-HDL) 07/26/2024 99    • TSH 3RD GENERATON 07/26/2024 2.106    • Hemoglobin A1C 07/26/2024 6.2 (H)    • EAG 07/26/2024 131    • Testosterone 07/26/2024 965 (H)    • PSA 07/26/2024 1.272             Impression:  Umbilical hernia.  Request repair and we will oblige him.     Plan:  Repair umbilical hernia under local anesthesia with IV sedation with mesh at the earliest possible date.

## 2025-01-15 NOTE — H&P
xpand All Collapse All  Chief Complaint: Umbilical hernia        History of Present Illness: Patient is a 59-year-old white male who presents the office today with an umbilical hernia.  He was apparently evaluated 3 years ago by Dr. Bryant in regards to this.  There were insurance issues that got in the way of him having this repaired but those issues have improved.  He also weighed about 242 pounds at that time and lost weight and currently weighs 215 pounds.  Diet and exercise.  He presents the office today with Dionne who ran the outpatient labs at at [facility] for over 40 years.     He denies any significant discomfort with the hernia but he says it is getting larger and he would like to have it repaired.  He denies any GI component to his symptoms.     He works construction.        Past Medical History:   Medical History   History reviewed. No pertinent past medical history.     Hypertension, prediabetes, arthritis.     Past Surgical History:    Surgical History         Past Surgical History:   Procedure Laterality Date    ANTERIOR CRUCIATE LIGAMENT REPAIR        CARPAL TUNNEL RELEASE        ELBOW SURGERY        ULNAR TUNNEL RELEASE   2005               Allergies:    Allergies        Allergies   Allergen Reactions    Sulfa Antibiotics Anaphylaxis    Erythromycin Abdominal Pain    Penicillins                 Medications:    Current Medications   Current Outpatient Medications:     amLODIPine (NORVASC) 2.5 mg tablet, TAKE ONE TABLET BY MOUTH ONCE DAILY, Disp: 30 tablet, Rfl: 5    ASPIRIN 81 PO, Take 1 tablet by mouth Daily, Disp: , Rfl:     dicyclomine (BENTYL) 20 mg tablet, Take 1 tablet by mouth in the morning, Disp: , Rfl:     FLUoxetine (PROzac) 40 MG capsule, TAKE ONE CAPSULE BY MOUTH IN THE MORNING, Disp: 30 capsule, Rfl: 5    lansoprazole (PREVACID) 30 mg capsule, Take 1 capsule (30 mg total) by mouth daily. Take before a meal., Disp: 90 capsule, Rfl: 1    LORazepam (ATIVAN) 1 mg tablet,  Take 1 tablet (1 mg total) by mouth 2 (two) times a day as needed for anxiety, Disp: 60 tablet, Rfl: 0    meloxicam (MOBIC) 7.5 mg tablet, Take 1 tablet (7.5 mg total) by mouth daily, Disp: 60 tablet, Rfl: 1    metoprolol tartrate (LOPRESSOR) 50 mg tablet, TAKE ONE TABLET BY MOUTH TWICE DAILY, Disp: 180 tablet, Rfl: 1    omeprazole (PriLOSEC) 40 MG capsule, TAKE ONE CAPSULE BY MOUTH EVERY MORNING, Disp: 90 capsule, Rfl: 0    simvastatin (ZOCOR) 20 mg tablet, Take 1 tablet (20 mg total) by mouth daily at bedtime, Disp: 90 tablet, Rfl: 1    zolpidem (AMBIEN) 10 mg tablet, Take 1 tablet (10 mg total) by mouth daily at bedtime as needed for sleep, Disp: 30 tablet, Rfl: 0           Social History:    Social History[]Expand by Default  Social History           Substance and Sexual Activity   Alcohol Use Not Currently     Comment: Quit 2022      Social History           Substance and Sexual Activity   Drug Use Yes    Types: Marijuana      Tobacco Use History   Social History           Tobacco Use   Smoking Status Former    Current packs/day: 0.00    Types: Cigarettes    Quit date: 2002    Years since quittin.0    Passive exposure: Past   Smokeless Tobacco Never               Family History:    Family History         Family History   Problem Relation Age of Onset    Pancreatic cancer Father                 Review of Systems:    As per the HPI.  Some periumbilical discomfort.  Insomnia.  Some arthralgias.  Otherwise is essentially negative for 12 systems reviewed.     Vitals:      Vitals:     01/15/25 1200   BP: 132/82   Pulse: (!) 52   Temp: 98.3 °F (36.8 °C)   SpO2: 98%         Physical Exam:  Patient is a healthy-appearing middle-aged white male 5 feet 8 inches 216 pounds.  He is awake alert no distress.     Vital signs as above     Skin warm dry  Head normocephalic and atraumatic  Eyes PERRLA EOM intact  Ears and nose within normal limits  Throat gag reflex intact  Neck no masses thyromegaly or lymphadenopathy  palpable.  Back no severe spinal tenderness  Lungs clear to A&P  Cor regular rate and rhythm no murmurs carotid bruits  Abdomen soft, a visible umbilical hernia is present that is reducible.  He has a small diastases recti which is also present.  No masses noted.  Extremities some arthritic changes are noted but negative CCE  Neurologically ANO x 3 cranial nerves II to XII intact  Lymphatics no lymphadenopathy palpable.        Lab Results: I have personally reviewed pertinent reports. See below.  Imaging: I have personally reviewed pertinent reports.   EKG, Pathology, and Other Studies: I have personally reviewed pertinent reports.            No visits with results within 1 Day(s) from this visit.   Latest known visit with results is:   Appointment on 07/26/2024   Component Date Value    WBC 07/26/2024 4.99     RBC 07/26/2024 5.73 (H)     Hemoglobin 07/26/2024 14.3     Hematocrit 07/26/2024 46.8     MCV 07/26/2024 82     MCH 07/26/2024 25.0 (L)     MCHC 07/26/2024 30.6 (L)     RDW 07/26/2024 20.8 (H)     MPV 07/26/2024 9.1     Platelets 07/26/2024 275     nRBC 07/26/2024 0     Segmented % 07/26/2024 45     Immature Grans % 07/26/2024 0     Lymphocytes % 07/26/2024 38     Monocytes % 07/26/2024 12     Eosinophils Relative 07/26/2024 4     Basophils Relative 07/26/2024 1     Absolute Neutrophils 07/26/2024 2.25     Absolute Immature Grans 07/26/2024 0.01     Absolute Lymphocytes 07/26/2024 1.91     Absolute Monocytes 07/26/2024 0.61     Eosinophils Absolute 07/26/2024 0.18     Basophils Absolute 07/26/2024 0.03     Sodium 07/26/2024 142     Potassium 07/26/2024 4.2     Chloride 07/26/2024 101     CO2 07/26/2024 31     ANION GAP 07/26/2024 10     BUN 07/26/2024 10     Creatinine 07/26/2024 1.01     Glucose, Fasting 07/26/2024 122 (H)     Calcium 07/26/2024 9.3     AST 07/26/2024 22     ALT 07/26/2024 20     Alkaline Phosphatase 07/26/2024 49     Total Protein 07/26/2024 6.5     Albumin 07/26/2024 4.1     Total  Bilirubin 07/26/2024 0.61     eGFR 07/26/2024 81     Cholesterol 07/26/2024 153     Triglycerides 07/26/2024 124     HDL, Direct 07/26/2024 54     LDL Calculated 07/26/2024 74     Non-HDL-Chol (CHOL-HDL) 07/26/2024 99     TSH 3RD GENERATON 07/26/2024 2.106     Hemoglobin A1C 07/26/2024 6.2 (H)     EAG 07/26/2024 131     Testosterone 07/26/2024 965 (H)     PSA 07/26/2024 1.272             Impression:  Umbilical hernia.  Request repair and we will oblige him.     Plan:  Repair umbilical hernia under local anesthesia with IV sedation with mesh at the earliest possible date.

## 2025-01-21 ENCOUNTER — ANESTHESIA EVENT (OUTPATIENT)
Dept: PERIOP | Facility: HOSPITAL | Age: 60
End: 2025-01-21
Payer: COMMERCIAL

## 2025-01-21 NOTE — PRE-PROCEDURE INSTRUCTIONS
Pre-Surgery Instructions:   Medication Instructions    amLODIPine (NORVASC) 2.5 mg tablet Take day of surgery.    ASPIRIN 81 PO Hold day of surgery.    FLUoxetine (PROzac) 40 MG capsule Take day of surgery.    lansoprazole (PREVACID) 30 mg capsule Take day of surgery.    LORazepam (ATIVAN) 1 mg tablet Uses PRN- OK to take day of surgery    meloxicam (MOBIC) 7.5 mg tablet Stop taking 3 days prior to surgery.    metoprolol tartrate (LOPRESSOR) 50 mg tablet Take day of surgery.    omeprazole (PriLOSEC) 40 MG capsule Take day of surgery.    simvastatin (ZOCOR) 20 mg tablet Take day of surgery.    zolpidem (AMBIEN) 10 mg tablet Take night before surgery     Medication instructions for day surgery reviewed. Please use only a sip of water to take your instructed medications. Avoid all over the counter vitamins, supplements and NSAIDS for one week prior to surgery per anesthesia guidelines. Tylenol is ok to take as needed.     You will receive a call one business day prior to surgery with an arrival time and hospital directions. If your surgery is scheduled on a Monday, the hospital will be calling you on the Friday prior to your surgery. If you have not heard from anyone by 8pm, please call the hospital supervisor through the hospital  at [phone]. ([place] [phone] or [place] [phone]).    Do not eat or drink anything after midnight the night before your surgery, including candy, mints, lifesavers, or chewing gum. Do not drink alcohol 24hrs before your surgery. Try not to smoke at least 24hrs before your surgery.       Follow the pre surgery showering instructions as listed in the “My Surgical Experience Booklet” or otherwise provided by your surgeon's office. Do not use a blade to shave the surgical area 1 week before surgery. It is okay to use a clean electric clippers up to 24 hours before surgery. Do not apply any lotions, creams, including makeup, cologne, deodorant, or perfumes after showering  on the day of your surgery. Do not use dry shampoo, hair spray, hair gel, or any type of hair products.     No contact lenses, eye make-up, or artificial eyelashes. Remove nail polish, including gel polish, and any artificial, gel, or acrylic nails if possible. Remove all jewelry including rings and body piercing jewelry.     Wear causal clothing that is easy to take on and off. Consider your type of surgery.    Keep any valuables, jewelry, piercings at home. Please bring any specially ordered equipment (sling, braces) if indicated.    Arrange for a responsible person to drive you to and from the hospital on the day of your surgery. Please confirm the visitor policy for the day of your procedure when you receive your phone call with an arrival time.     Call the surgeon's office with any new illnesses, exposures, or additional questions prior to surgery.    Please reference your “My Surgical Experience Booklet” for additional information to prepare for your upcoming surgery.

## 2025-01-23 ENCOUNTER — ANESTHESIA (OUTPATIENT)
Dept: PERIOP | Facility: HOSPITAL | Age: 60
End: 2025-01-23
Payer: COMMERCIAL

## 2025-01-23 ENCOUNTER — HOSPITAL ENCOUNTER (OUTPATIENT)
Facility: HOSPITAL | Age: 60
Setting detail: OUTPATIENT SURGERY
Discharge: HOME | End: 2025-01-23
Attending: SPECIALIST | Admitting: SPECIALIST
Payer: COMMERCIAL

## 2025-01-23 VITALS
DIASTOLIC BLOOD PRESSURE: 89 MMHG | HEART RATE: 50 BPM | OXYGEN SATURATION: 98 % | TEMPERATURE: 97.6 F | RESPIRATION RATE: 16 BRPM | SYSTOLIC BLOOD PRESSURE: 142 MMHG

## 2025-01-23 DIAGNOSIS — K42.9 UMBILICAL HERNIA: Primary | ICD-10-CM

## 2025-01-23 PROBLEM — G47.30 SLEEP APNEA: Status: ACTIVE | Noted: 2025-01-23

## 2025-01-23 PROCEDURE — 49593 RPR AA HRN 1ST 3-10 RDC: CPT | Performed by: SPECIALIST

## 2025-01-23 PROCEDURE — C1781 MESH (IMPLANTABLE): HCPCS | Performed by: SPECIALIST

## 2025-01-23 DEVICE — VENTRALEX ST HERNIA PATCH, 4.3 CM (1.7"), CIRCLE
Type: IMPLANTABLE DEVICE | Site: UMBILICAL | Status: FUNCTIONAL
Brand: VENTRALEX

## 2025-01-23 RX ORDER — LIDOCAINE HYDROCHLORIDE 10 MG/ML
INJECTION, SOLUTION EPIDURAL; INFILTRATION; INTRACAUDAL; PERINEURAL AS NEEDED
Status: DISCONTINUED | OUTPATIENT
Start: 2025-01-23 | End: 2025-01-23 | Stop reason: HOSPADM

## 2025-01-23 RX ORDER — PROPOFOL 10 MG/ML
INJECTION, EMULSION INTRAVENOUS CONTINUOUS PRN
Status: DISCONTINUED | OUTPATIENT
Start: 2025-01-23 | End: 2025-01-23

## 2025-01-23 RX ORDER — KETOROLAC TROMETHAMINE 30 MG/ML
INJECTION, SOLUTION INTRAMUSCULAR; INTRAVENOUS AS NEEDED
Status: DISCONTINUED | OUTPATIENT
Start: 2025-01-23 | End: 2025-01-23

## 2025-01-23 RX ORDER — OXYCODONE AND ACETAMINOPHEN 5; 325 MG/1; MG/1
1 TABLET ORAL EVERY 6 HOURS PRN
Qty: 20 TABLET | Refills: 0 | Status: SHIPPED | OUTPATIENT
Start: 2025-01-23 | End: 2025-02-02

## 2025-01-23 RX ORDER — MIDAZOLAM HYDROCHLORIDE 2 MG/2ML
INJECTION, SOLUTION INTRAMUSCULAR; INTRAVENOUS AS NEEDED
Status: DISCONTINUED | OUTPATIENT
Start: 2025-01-23 | End: 2025-01-23

## 2025-01-23 RX ORDER — CEFAZOLIN SODIUM 2 G/50ML
2000 SOLUTION INTRAVENOUS ONCE
Status: COMPLETED | OUTPATIENT
Start: 2025-01-23 | End: 2025-01-23

## 2025-01-23 RX ORDER — BUPIVACAINE HYDROCHLORIDE 5 MG/ML
INJECTION, SOLUTION EPIDURAL; INTRACAUDAL AS NEEDED
Status: DISCONTINUED | OUTPATIENT
Start: 2025-01-23 | End: 2025-01-23 | Stop reason: HOSPADM

## 2025-01-23 RX ORDER — ONDANSETRON 2 MG/ML
4 INJECTION INTRAMUSCULAR; INTRAVENOUS EVERY 8 HOURS PRN
Status: DISCONTINUED | OUTPATIENT
Start: 2025-01-23 | End: 2025-01-23 | Stop reason: HOSPADM

## 2025-01-23 RX ORDER — MAGNESIUM HYDROXIDE 1200 MG/15ML
LIQUID ORAL AS NEEDED
Status: DISCONTINUED | OUTPATIENT
Start: 2025-01-23 | End: 2025-01-23 | Stop reason: HOSPADM

## 2025-01-23 RX ORDER — FENTANYL CITRATE 50 UG/ML
INJECTION, SOLUTION INTRAMUSCULAR; INTRAVENOUS AS NEEDED
Status: DISCONTINUED | OUTPATIENT
Start: 2025-01-23 | End: 2025-01-23

## 2025-01-23 RX ORDER — LIDOCAINE HYDROCHLORIDE 10 MG/ML
INJECTION, SOLUTION EPIDURAL; INFILTRATION; INTRACAUDAL; PERINEURAL AS NEEDED
Status: DISCONTINUED | OUTPATIENT
Start: 2025-01-23 | End: 2025-01-23

## 2025-01-23 RX ORDER — OXYCODONE AND ACETAMINOPHEN 5; 325 MG/1; MG/1
1 TABLET ORAL EVERY 4 HOURS PRN
Refills: 0 | Status: DISCONTINUED | OUTPATIENT
Start: 2025-01-23 | End: 2025-01-23 | Stop reason: HOSPADM

## 2025-01-23 RX ORDER — FENTANYL CITRATE/PF 50 MCG/ML
25 SYRINGE (ML) INJECTION
Status: DISCONTINUED | OUTPATIENT
Start: 2025-01-23 | End: 2025-01-23 | Stop reason: HOSPADM

## 2025-01-23 RX ORDER — SODIUM CHLORIDE, SODIUM LACTATE, POTASSIUM CHLORIDE, CALCIUM CHLORIDE 600; 310; 30; 20 MG/100ML; MG/100ML; MG/100ML; MG/100ML
125 INJECTION, SOLUTION INTRAVENOUS CONTINUOUS
Status: DISCONTINUED | OUTPATIENT
Start: 2025-01-23 | End: 2025-01-23 | Stop reason: HOSPADM

## 2025-01-23 RX ORDER — ONDANSETRON 2 MG/ML
4 INJECTION INTRAMUSCULAR; INTRAVENOUS ONCE AS NEEDED
Status: DISCONTINUED | OUTPATIENT
Start: 2025-01-23 | End: 2025-01-23 | Stop reason: HOSPADM

## 2025-01-23 RX ADMIN — LIDOCAINE HYDROCHLORIDE 50 MG: 10 INJECTION, SOLUTION EPIDURAL; INFILTRATION; INTRACAUDAL; PERINEURAL at 13:18

## 2025-01-23 RX ADMIN — KETOROLAC TROMETHAMINE 30 MG: 30 INJECTION, SOLUTION INTRAMUSCULAR; INTRAVENOUS at 14:30

## 2025-01-23 RX ADMIN — FENTANYL CITRATE 100 MCG: 50 INJECTION, SOLUTION INTRAMUSCULAR; INTRAVENOUS at 13:17

## 2025-01-23 RX ADMIN — MIDAZOLAM 2 MG: 1 INJECTION INTRAMUSCULAR; INTRAVENOUS at 13:07

## 2025-01-23 RX ADMIN — CEFAZOLIN SODIUM 2000 MG: 2 SOLUTION INTRAVENOUS at 13:17

## 2025-01-23 RX ADMIN — OXYCODONE HYDROCHLORIDE AND ACETAMINOPHEN 1 TABLET: 5; 325 TABLET ORAL at 15:50

## 2025-01-23 RX ADMIN — SODIUM CHLORIDE, SODIUM LACTATE, POTASSIUM CHLORIDE, AND CALCIUM CHLORIDE: .6; .31; .03; .02 INJECTION, SOLUTION INTRAVENOUS at 12:51

## 2025-01-23 RX ADMIN — PROPOFOL 150 MCG/KG/MIN: 10 INJECTION, EMULSION INTRAVENOUS at 13:28

## 2025-01-23 NOTE — OP NOTE
OPERATIVE REPORT  PATIENT NAME: Roberto Moore    :  1965  MRN: [id number]  Pt Location:  OR ROOM 10    SURGERY DATE: 2025    Surgeons and Role:     * Delfin Srinivasan MD - Primary    Preop Diagnosis:  Umbilical hernia [K42.9]    Post-Op Diagnosis Codes:     * Umbilical hernia [K42.9]    Procedure(s):  REPAIR UMBILICAL HERNIA WITH MESH    Specimen(s):  * No specimens in log *    Estimated Blood Loss:   Minimal    Drains:  * No LDAs found *    Anesthesia Type:   IV Sedation with Anesthesia    Operative Indications:  Umbilical hernia [K42.9]      Operative Findings:      Prior to opening the fascia, or reducing the contents of the hernia, the hernia defect was measured. The defect measured 3 cm cm by 3 cm cm. This was repaired as described in the body of the report below.      Complications:   None    Procedure and Technique:  The patient was brought to the operating room placed in the operative table in a supine position.  Under adequate IV sedation the abdomen was shaved prepped and draped in usual sterile fashion.    1% lidocaine was used to infiltrate the periumbilical area.  A 15 blade was used to make an infraumbilical transverse curvilinear incision.  This was carried down subtenons tissue using the Bovie.  Bleeders were cauterized at that time.  Retractors were inserted in the wound and dissection continued.  There was an obvious hernia adherent to the posterior aspect of the umbilical skin and this was dissected free.  The fascial edges were grasped with Allis clamps and dissection continued circumferentially around the defect.  In dissecting the umbilical skin further cephalad an additional defect was identified albeit small with some fat protruding through this.  After further dissection it was felt that connecting the 2 defects was the best way of dealing with this problem and repairing this is 1 larger hernia.  At that point after freeing up the edges and the preperitoneal space a small  Ventralex mesh was obtained.  It was folded appropriate and passed through the defect.  It was deployed in usual fashion effectively repairing the hernia.  It was suture-ligated in place using 0 Ethibond suture in a figure-of-eight fashion.  Bleeding is controlled electrocautery.  After adequate hemostasis was obtained the area is infiltrated with half percent Marcaine.  Any residual bleeding is controlled.  At that point the wound was closed in layers first tacking the umbilical skin to the repair using 3-0 Vicryl suture.  The dermis and subcutaneous tissue was reapproximated with 3-0 Vicryl interrupted fashion.  The skin is closed with 4 Monocryl in a running subsequent fashion.  Benzoin and Steri-Strips were applied.  A 2 x 2 was rolled up in the umbilicus and a 4 x 4 was placed on the wound.  The estimated blood loss is minimal patient tolerated the procedure well he was delivered to the recovery room stable condition.  Rafael SOTO was present for the entire procedure.    Patient Disposition:  PACU              SIGNATURE: Delfin Srinivasan MD  DATE: January 23, 2025  TIME: 5:37 PM

## 2025-01-23 NOTE — NURSING NOTE
Tolerating toast and drinks. Dressing remains clean, dry and intact. No drainage. Has not voided as of yet.

## 2025-01-23 NOTE — ANESTHESIA POSTPROCEDURE EVALUATION
Post-Op Assessment Note    CV Status:  Stable  Pain Score: 0    Pain management: adequate       Mental Status:  Awake and sleepy   Hydration Status:  Euvolemic   PONV Controlled:  Controlled   Airway Patency:  Patent     Post Op Vitals Reviewed: Yes    No anethesia notable event occurred.    Staff: CRNA       Last Filed PACU Vitals:  Vitals Value Taken Time   Temp 98.1 °F (36.7 °C) 01/23/25 1441   Pulse 55 01/23/25 1444   /67 01/23/25 1441   Resp 15 01/23/25 1444   SpO2 97 % 01/23/25 1444   Vitals shown include unfiled device data.

## 2025-01-23 NOTE — ANESTHESIA PREPROCEDURE EVALUATION
Procedure:  REPAIR UMBILICAL HERNIA WITH MESH (Abdomen)    Relevant Problems   CARDIO   (+) Hyperlipidemia   (+) Hypertension   (+) Migraine      GI/HEPATIC   (+) Gastroesophageal reflux disease      MUSCULOSKELETAL   (+) Arthritis      NEURO/PSYCH   (+) Generalized anxiety disorder   (+) Migraine      PULMONARY   (+) Sleep apnea      Neurology/Sleep   (+) Insomnia   (+) Sleeping difficulties      Care Coordination   (+) Insurance coverage problems      Rheumatology   (+) Tear of medial meniscus of knee      Other   (+) Annual physical exam   (+) Prediabetes        Physical Exam    Airway    Mallampati score: II  TM Distance: >3 FB  Neck ROM: full     Dental    upper dentures and lower dentures    Cardiovascular  Cardiovascular exam normal    Pulmonary  Pulmonary exam normal     Other Findings        Anesthesia Plan  ASA Score- 2     Anesthesia Type- IV sedation with anesthesia with ASA Monitors.         Additional Monitors:     Airway Plan: ETT and LMA.           Plan Factors-Exercise tolerance (METS): >4 METS.    Chart reviewed.   Existing labs reviewed.     Patient is not a current smoker. Patient not instructed to abstain from smoking on day of procedure. Patient did not smoke on day of surgery.    Obstructive sleep apnea risk education given perioperatively.There is medical exclusion for perioperative obstructive sleep apnea risk education.        Induction- intravenous.    Postoperative Plan-         Informed Consent- Anesthetic plan and risks discussed with patient.  I personally reviewed this patient with the CRNA. Discussed and agreed on the Anesthesia Plan with the CRNA..      NPO Status:  No vitals data found for the desired time range.

## 2025-01-23 NOTE — ANESTHESIA POSTPROCEDURE EVALUATION
Post-Op Assessment Note    Last Filed PACU Vitals:  Vitals Value Taken Time   Temp 98.1    Pulse 49 01/23/25 1443   /67    Resp 16    SpO2 95 % 01/23/25 1443   Vitals shown include unfiled device data.

## 2025-01-28 ENCOUNTER — TELEPHONE (OUTPATIENT)
Dept: FAMILY MEDICINE CLINIC | Facility: CLINIC | Age: 60
End: 2025-01-28

## 2025-01-28 ENCOUNTER — TELEPHONE (OUTPATIENT)
Age: 60
End: 2025-01-28

## 2025-01-28 NOTE — TELEPHONE ENCOUNTER
Patient of Dr. Chay Hidalgo  At office: Mike  Calling about : Due to GI issues, patient defecated 7-8 time a day. Request medication refill of previous  dicyclomine (BENTYL) 20 mg tablet [[id number]] , to be sent to   [id number] [place] [facility] [place] [place] [facility] [address]  [address], [place] [address]  Phone: [phone]  Fax: [phone]. Patient states   umbilical surgery everything went well  Began on 1/28/2025  Action Requested   Preferred method of contact Please advise Patient [phone], if any further Questions.

## 2025-01-28 NOTE — TELEPHONE ENCOUNTER
Patient called the  office regarding his message. Pt states he will not come in to the office and pay money for something he had for years. Patient also express that if the provider will not fill this medication then he will look for another doctor. Please review and advise.

## 2025-02-04 ENCOUNTER — OFFICE VISIT (OUTPATIENT)
Dept: SURGERY | Facility: CLINIC | Age: 60
End: 2025-02-04
Payer: COMMERCIAL

## 2025-02-04 VITALS
HEIGHT: 68 IN | HEART RATE: 46 BPM | BODY MASS INDEX: 33.34 KG/M2 | OXYGEN SATURATION: 99 % | WEIGHT: 220 LBS | TEMPERATURE: 97.3 F

## 2025-02-04 DIAGNOSIS — K42.9 UMBILICAL HERNIA WITHOUT OBSTRUCTION AND WITHOUT GANGRENE: Primary | ICD-10-CM

## 2025-02-04 DIAGNOSIS — F41.1 GENERALIZED ANXIETY DISORDER: ICD-10-CM

## 2025-02-04 DIAGNOSIS — K21.9 GASTROESOPHAGEAL REFLUX DISEASE WITHOUT ESOPHAGITIS: ICD-10-CM

## 2025-02-04 PROCEDURE — 99212 OFFICE O/P EST SF 10 MIN: CPT | Performed by: SPECIALIST

## 2025-02-04 RX ORDER — OMEPRAZOLE 40 MG/1
40 CAPSULE, DELAYED RELEASE ORAL EVERY MORNING
Qty: 90 CAPSULE | Refills: 0 | Status: SHIPPED | OUTPATIENT
Start: 2025-02-04

## 2025-02-04 RX ORDER — LORAZEPAM 1 MG/1
1 TABLET ORAL 2 TIMES DAILY PRN
Qty: 60 TABLET | Refills: 0 | Status: SHIPPED | OUTPATIENT
Start: 2025-02-04

## 2025-02-04 NOTE — PROGRESS NOTES
Tarik presents today for his follow-up visit status post repair of an umbilical hernia.    Today in the office he says he feels great.  He denies any significant pain.  He is tolerating his diet.  His bowels are moving.    Physical exam: Middle-age healthy-appearing white male.    Abdomen: Small smile shape incision under the umbilicus that is healing quite well.  There is no evidence of infection or recurrence.  Normal postoperative edema is noted.  He is  Cosmetic result.    Impression: Doing well status post repair of an umbilical hernia with mesh.    Plan: Weight limit restricted to 20 pounds over the next 2 weeks.  He will given a work release for light duty.  After 2 weeks he has no restrictions.    Was a pleasure to meet him.  He is a great jag.

## 2025-02-12 DIAGNOSIS — F51.01 PRIMARY INSOMNIA: ICD-10-CM

## 2025-02-13 RX ORDER — ZOLPIDEM TARTRATE 10 MG/1
10 TABLET ORAL
Qty: 30 TABLET | Refills: 0 | Status: SHIPPED | OUTPATIENT
Start: 2025-02-13

## 2025-03-16 DIAGNOSIS — F51.01 PRIMARY INSOMNIA: ICD-10-CM

## 2025-03-17 RX ORDER — ZOLPIDEM TARTRATE 10 MG/1
10 TABLET ORAL
Qty: 30 TABLET | Refills: 0 | Status: SHIPPED | OUTPATIENT
Start: 2025-03-17

## 2025-04-09 DIAGNOSIS — F41.1 GENERALIZED ANXIETY DISORDER: ICD-10-CM

## 2025-04-10 RX ORDER — LORAZEPAM 1 MG/1
1 TABLET ORAL 2 TIMES DAILY PRN
Qty: 60 TABLET | Refills: 0 | Status: SHIPPED | OUTPATIENT
Start: 2025-04-10

## 2025-04-14 DIAGNOSIS — M19.90 ARTHRITIS: ICD-10-CM

## 2025-04-14 DIAGNOSIS — F51.01 PRIMARY INSOMNIA: ICD-10-CM

## 2025-04-16 RX ORDER — ZOLPIDEM TARTRATE 10 MG/1
10 TABLET ORAL
Qty: 30 TABLET | Refills: 0 | Status: SHIPPED | OUTPATIENT
Start: 2025-04-16

## 2025-04-16 RX ORDER — MELOXICAM 7.5 MG/1
7.5 TABLET ORAL DAILY
Qty: 60 TABLET | Refills: 0 | Status: SHIPPED | OUTPATIENT
Start: 2025-04-16

## 2025-05-01 DIAGNOSIS — Z87.19 H/O GASTROESOPHAGEAL REFLUX (GERD): ICD-10-CM

## 2025-05-01 DIAGNOSIS — K21.9 GASTROESOPHAGEAL REFLUX DISEASE WITHOUT ESOPHAGITIS: ICD-10-CM

## 2025-05-06 RX ORDER — LANSOPRAZOLE 30 MG/1
30 CAPSULE, DELAYED RELEASE ORAL DAILY
Qty: 90 CAPSULE | Refills: 0 | Status: SHIPPED | OUTPATIENT
Start: 2025-05-06

## 2025-05-06 RX ORDER — OMEPRAZOLE 40 MG/1
40 CAPSULE, DELAYED RELEASE ORAL EVERY MORNING
Qty: 90 CAPSULE | Refills: 0 | Status: SHIPPED | OUTPATIENT
Start: 2025-05-06

## 2025-05-16 DIAGNOSIS — F51.01 PRIMARY INSOMNIA: ICD-10-CM

## 2025-05-19 RX ORDER — ZOLPIDEM TARTRATE 10 MG/1
10 TABLET ORAL
Qty: 30 TABLET | Refills: 0 | Status: SHIPPED | OUTPATIENT
Start: 2025-05-19

## 2025-05-21 DIAGNOSIS — F41.1 GENERALIZED ANXIETY DISORDER: ICD-10-CM

## 2025-05-22 RX ORDER — LORAZEPAM 1 MG/1
1 TABLET ORAL 2 TIMES DAILY PRN
Qty: 60 TABLET | Refills: 0 | Status: SHIPPED | OUTPATIENT
Start: 2025-05-22

## 2025-06-12 ENCOUNTER — OFFICE VISIT (OUTPATIENT)
Dept: FAMILY MEDICINE CLINIC | Facility: CLINIC | Age: 60
End: 2025-06-12
Payer: COMMERCIAL

## 2025-06-12 VITALS
HEART RATE: 51 BPM | DIASTOLIC BLOOD PRESSURE: 82 MMHG | OXYGEN SATURATION: 99 % | WEIGHT: 216.2 LBS | SYSTOLIC BLOOD PRESSURE: 124 MMHG | BODY MASS INDEX: 32.77 KG/M2 | HEIGHT: 68 IN | TEMPERATURE: 98 F

## 2025-06-12 DIAGNOSIS — F41.1 GENERALIZED ANXIETY DISORDER: ICD-10-CM

## 2025-06-12 DIAGNOSIS — K21.9 GASTROESOPHAGEAL REFLUX DISEASE WITHOUT ESOPHAGITIS: ICD-10-CM

## 2025-06-12 DIAGNOSIS — I10 PRIMARY HYPERTENSION: Primary | ICD-10-CM

## 2025-06-12 DIAGNOSIS — M19.90 ARTHRITIS: ICD-10-CM

## 2025-06-12 DIAGNOSIS — R73.03 PREDIABETES: ICD-10-CM

## 2025-06-12 PROCEDURE — 99214 OFFICE O/P EST MOD 30 MIN: CPT

## 2025-06-12 RX ORDER — FAMOTIDINE 40 MG/1
40 TABLET, FILM COATED ORAL
Qty: 90 TABLET | Refills: 3 | Status: SHIPPED | OUTPATIENT
Start: 2025-06-12

## 2025-06-12 RX ORDER — MELOXICAM 15 MG/1
15 TABLET ORAL DAILY
Qty: 90 TABLET | Refills: 1 | Status: SHIPPED | OUTPATIENT
Start: 2025-06-12

## 2025-06-12 NOTE — ASSESSMENT & PLAN NOTE
Current BP: Blood Pressure: 124/82   JNC 8 Goal <130/90 for T2DM  Current Medications: Amlodipine 2.5 mg qd and Lopressor 50 mg qd   Blood pressure is controlled on present treatment regimen.  Patient misses no doses and tolerates the meds without side effects.  Patient avoids salt.    PLAN:   - Discussed adhering to htn/dash diet including limiting salt to < 2.4 g daily   - Physical activity at least 4/5x weekly of moderate intense activity of 40 minutes   - Will do kidney function - microalbuminuria/creatinine ratio   - Advised to limit caffeine and alcohol   - No change in present meds. Continue HBPM, advise to bring logs to next visit    Orders:  •  Albumin / creatinine urine ratio; Future

## 2025-06-12 NOTE — ASSESSMENT & PLAN NOTE
Lab Results   Component Value Date    HGBA1C 6.2 (H) 07/26/2024   Increasing from 6.0   Repeat labs prior to next visit   Diet controlled at this time

## 2025-06-12 NOTE — PROGRESS NOTES
Name: Roberto Moore      : 1965      MRN: [id number]  Encounter Provider: Chay Hidalgo MD  Encounter Date: 2025   Encounter department: [facility]    Assessment & Plan  Primary hypertension  Current BP: Blood Pressure: 124/82   JNC 8 Goal <130/90 for T2DM  Current Medications: Amlodipine 2.5 mg qd and Lopressor 50 mg qd   Blood pressure is controlled on present treatment regimen.  Patient misses no doses and tolerates the meds without side effects.  Patient avoids salt.    PLAN:   - Discussed adhering to htn/dash diet including limiting salt to < 2.4 g daily   - Physical activity at least 4/5x weekly of moderate intense activity of 40 minutes   - Will do kidney function - microalbuminuria/creatinine ratio   - Advised to limit caffeine and alcohol   - No change in present meds. Continue HBPM, advise to bring logs to next visit    Orders:  •  Albumin / creatinine urine ratio; Future     Gastroesophageal reflux disease without esophagitis  Is currently on Lansoprazole and omeprazole   Will d/c lansoprazole due to long term ppi effects   Will start Pepcid   Orders:  •  famotidine (PEPCID) 40 MG tablet; Take 1 tablet (40 mg total) by mouth daily at bedtime    Generalized anxiety disorder  Stable   Current medications        Prediabetes  Lab Results   Component Value Date    HGBA1C 6.2 (H) 2024   Increasing from 6.0   Repeat labs prior to next visit   Diet controlled at this time        Arthritis  Has improved but not completed resolved   Will increase to Mobic 15 mg qd   Orders:  •  meloxicam (MOBIC) 15 mg tablet; Take 1 tablet (15 mg total) by mouth daily         History of Present Illness     Roberto Moore is a 59 y.o. male with pmhx of htn presenting today review of chronic conditions.     Patient reports that he is doing well/ have been compliant with medications.         Review of Systems   Constitutional:  Negative for chills and fever.   HENT:  Negative for  "ear pain and sore throat.    Eyes:  Negative for pain and visual disturbance.   Respiratory:  Negative for cough and shortness of breath.    Cardiovascular:  Negative for chest pain and palpitations.   Gastrointestinal:  Negative for abdominal pain and vomiting.   Genitourinary:  Negative for dysuria and hematuria.   Musculoskeletal:  Negative for arthralgias and back pain.   Skin:  Negative for color change and rash.   Neurological:  Negative for seizures and syncope.   All other systems reviewed and are negative.    Past Medical History[1]  Past Surgical History[2]  Family History[3]  Social History[4]  Medications[5]  Allergies   Allergen Reactions   • Sulfa Antibiotics Anaphylaxis   • Erythromycin Abdominal Pain     Immunization History   Administered Date(s) Administered   • COVID-19 PFIZER VACCINE 0.3 ML IM 02/24/2021, 03/17/2021, 11/12/2021   • COVID-19 Pfizer Vac BIVALENT Andrew-sucrose 12 Yr+ IM 10/08/2022   • COVID-19 Pfizer mRNA vacc PF andrew-sucrose 12 yr and older (Comirnaty) 09/16/2023, 09/05/2024   • COVID-19 Pfizer vac (Andrew-sucrose, gray cap) 12 yr+ IM 06/18/2022   • INFLUENZA 10/03/2017, 09/23/2018, 10/13/2021   • Influenza Injectable, MDCK, Preservative Free, 0.5 mL 09/05/2024   • Influenza Injectable, MDCK, Preservative Free, Quadrivalent 10/16/2019   • Influenza, injectable, quadrivalent, preservative free 0.5 mL 09/23/2018, 10/29/2020   • Influenza, seasonal, injectable 12/14/2011, 12/03/2012   • Td (adult), adsorbed 03/06/2006   • Tdap 01/21/2021     Objective   /82 (BP Location: Left arm, Patient Position: Sitting, Cuff Size: Adult)   Pulse (!) 51   Temp 98 °F (36.7 °C) (Temporal)   Ht 5' 8\" (1.727 m)   Wt 98.1 kg (216 lb 3.2 oz)   SpO2 99%   BMI 32.87 kg/m²     Physical Exam  Vitals and nursing note reviewed.   Constitutional:       General: He is not in acute distress.     Appearance: He is well-developed.   HENT:      Head: Normocephalic and atraumatic.     Eyes:      " Conjunctiva/sclera: Conjunctivae normal.       Cardiovascular:      Rate and Rhythm: Normal rate and regular rhythm.      Heart sounds: No murmur heard.  Pulmonary:      Effort: Pulmonary effort is normal. No respiratory distress.      Breath sounds: Normal breath sounds.   Abdominal:      Palpations: Abdomen is soft.      Tenderness: There is no abdominal tenderness.     Musculoskeletal:         General: No swelling.      Cervical back: Neck supple.     Skin:     General: Skin is warm and dry.      Capillary Refill: Capillary refill takes less than 2 seconds.     Neurological:      Mental Status: He is alert.     Psychiatric:         Mood and Affect: Mood normal.                [1]  No past medical history on file.[2]  Past Surgical History:  Procedure Laterality Date   • ANTERIOR CRUCIATE LIGAMENT REPAIR Right    • CARPAL TUNNEL RELEASE Left    • ELBOW SURGERY Left    • CA RPR AA HERNIA 1ST < 3 CM REDUCIBLE N/A 2025    Procedure: REPAIR UMBILICAL HERNIA WITH MESH;  Surgeon: Delfin Srinivasan MD;  Location:  MAIN OR;  Service: General   • ULNAR TUNNEL RELEASE Left    [3]  Family History  Problem Relation Name Age of Onset   • Pancreatic cancer Father     [4]  Social History  Tobacco Use   • Smoking status: Former     Current packs/day: 0.00     Average packs/day: 1 pack/day for 20.0 years (20.0 ttl pk-yrs)     Types: Cigarettes     Start date:      Quit date: 2002     Years since quittin.4     Passive exposure: Past   • Smokeless tobacco: Never   Vaping Use   • Vaping status: Never Used   Substance and Sexual Activity   • Alcohol use: Not Currently     Comment: Quit 2022   • Drug use: Yes     Types: Marijuana   • Sexual activity: Yes     Partners: Female   [5]  Current Outpatient Medications on File Prior to Visit   Medication Sig   • amLODIPine (NORVASC) 2.5 mg tablet TAKE ONE TABLET BY MOUTH ONCE DAILY   • ASPIRIN 81 PO Take 1 tablet by mouth in the morning.   • dicyclomine (BENTYL) 20 mg  tablet Take 1 tablet by mouth in the morning   • FLUoxetine (PROzac) 40 MG capsule TAKE ONE CAPSULE BY MOUTH IN THE MORNING   • LORazepam (ATIVAN) 1 mg tablet Take 1 tablet (1 mg total) by mouth 2 (two) times a day as needed for anxiety   • metoprolol tartrate (LOPRESSOR) 50 mg tablet TAKE ONE TABLET BY MOUTH TWICE DAILY   • omeprazole (PriLOSEC) 40 MG capsule TAKE ONE CAPSULE BY MOUTH EVERY MORNING   • simvastatin (ZOCOR) 20 mg tablet Take 1 tablet (20 mg total) by mouth daily at bedtime   • zolpidem (AMBIEN) 10 mg tablet Take 1 tablet (10 mg total) by mouth daily at bedtime as needed for sleep   • [DISCONTINUED] lansoprazole (PREVACID) 30 mg capsule Take 1 capsule (30 mg total) by mouth daily. Take before a meal.   • [DISCONTINUED] meloxicam (MOBIC) 7.5 mg tablet Take 1 tablet (7.5 mg total) by mouth daily.

## 2025-06-12 NOTE — ASSESSMENT & PLAN NOTE
Has improved but not completed resolved   Will increase to Mobic 15 mg qd   Orders:  •  meloxicam (MOBIC) 15 mg tablet; Take 1 tablet (15 mg total) by mouth daily

## 2025-06-12 NOTE — ASSESSMENT & PLAN NOTE
Is currently on Lansoprazole and omeprazole   Will d/c lansoprazole due to long term ppi effects   Will start Pepcid   Orders:  •  famotidine (PEPCID) 40 MG tablet; Take 1 tablet (40 mg total) by mouth daily at bedtime

## 2025-06-14 DIAGNOSIS — F51.01 PRIMARY INSOMNIA: ICD-10-CM

## 2025-06-16 RX ORDER — ZOLPIDEM TARTRATE 10 MG/1
10 TABLET ORAL
Qty: 30 TABLET | Refills: 0 | Status: SHIPPED | OUTPATIENT
Start: 2025-06-16

## 2025-07-03 DIAGNOSIS — E78.5 HYPERLIPIDEMIA, UNSPECIFIED HYPERLIPIDEMIA TYPE: ICD-10-CM

## 2025-07-03 RX ORDER — SIMVASTATIN 20 MG
20 TABLET ORAL
Qty: 90 TABLET | Refills: 1 | Status: SHIPPED | OUTPATIENT
Start: 2025-07-03

## 2025-07-05 DIAGNOSIS — I10 PRIMARY HYPERTENSION: ICD-10-CM

## 2025-07-05 DIAGNOSIS — F41.9 ANXIETY: ICD-10-CM

## 2025-07-07 RX ORDER — FLUOXETINE HYDROCHLORIDE 40 MG/1
40 CAPSULE ORAL EVERY MORNING
Qty: 30 CAPSULE | Refills: 5 | Status: SHIPPED | OUTPATIENT
Start: 2025-07-07

## 2025-07-07 RX ORDER — AMLODIPINE BESYLATE 2.5 MG/1
2.5 TABLET ORAL DAILY
Qty: 30 TABLET | Refills: 5 | Status: SHIPPED | OUTPATIENT
Start: 2025-07-07

## 2025-07-15 DIAGNOSIS — F41.1 GENERALIZED ANXIETY DISORDER: ICD-10-CM

## 2025-07-15 DIAGNOSIS — F51.01 PRIMARY INSOMNIA: ICD-10-CM

## 2025-07-17 RX ORDER — LORAZEPAM 1 MG/1
1 TABLET ORAL 2 TIMES DAILY PRN
Qty: 60 TABLET | Refills: 0 | Status: SHIPPED | OUTPATIENT
Start: 2025-07-17

## 2025-07-17 RX ORDER — ZOLPIDEM TARTRATE 10 MG/1
10 TABLET ORAL
Qty: 30 TABLET | Refills: 0 | Status: SHIPPED | OUTPATIENT
Start: 2025-07-17

## 2025-07-30 DIAGNOSIS — K21.9 GASTROESOPHAGEAL REFLUX DISEASE WITHOUT ESOPHAGITIS: ICD-10-CM

## 2025-07-30 RX ORDER — OMEPRAZOLE 40 MG/1
40 CAPSULE, DELAYED RELEASE ORAL EVERY MORNING
Qty: 90 CAPSULE | Refills: 0 | Status: SHIPPED | OUTPATIENT
Start: 2025-07-30

## 2025-08-18 DIAGNOSIS — F51.01 PRIMARY INSOMNIA: ICD-10-CM

## 2025-08-19 DIAGNOSIS — I15.9 SECONDARY HYPERTENSION: ICD-10-CM

## 2025-08-19 RX ORDER — METOPROLOL TARTRATE 50 MG
50 TABLET ORAL 2 TIMES DAILY
Qty: 180 TABLET | Refills: 1 | Status: SHIPPED | OUTPATIENT
Start: 2025-08-19

## 2025-08-20 RX ORDER — ZOLPIDEM TARTRATE 10 MG/1
10 TABLET ORAL
Qty: 30 TABLET | Refills: 0 | Status: SHIPPED | OUTPATIENT
Start: 2025-08-20

## (undated) DEVICE — HYDROPHILIC WOUND DRESSING WITH ZINC PLUS VITAMINS A AND B6.: Brand: DERMAGRAN®-B

## (undated) DEVICE — SUT ETHIBOND 0 CT-2 30 IN X412H

## (undated) DEVICE — VIOLET BRAIDED (POLYGLACTIN 910), SYNTHETIC ABSORBABLE SUTURE: Brand: COATED VICRYL

## (undated) DEVICE — PAD GROUNDING DUAL ADULT

## (undated) DEVICE — SYRINGE 30ML LL

## (undated) DEVICE — SCD SEQUENTIAL COMPRESSION COMFORT SLEEVE MEDIUM KNEE LENGTH: Brand: KENDALL SCD

## (undated) DEVICE — 3M™ STERI-STRIP™ REINFORCED ADHESIVE SKIN CLOSURES, R1547, 1/2 IN X 4 IN (12 MM X 100 MM), 6 STRIPS/ENVELOPE: Brand: 3M™ STERI-STRIP™

## (undated) DEVICE — NEPTUNE E-SEP SMOKE EVACUATION PENCIL, COATED, 70MM BLADE, PUSH BUTTON SWITCH: Brand: NEPTUNE E-SEP

## (undated) DEVICE — INTENDED FOR TISSUE SEPARATION, AND OTHER PROCEDURES THAT REQUIRE A SHARP SURGICAL BLADE TO PUNCTURE OR CUT.: Brand: BARD-PARKER SAFETY BLADES SIZE 15, STERILE

## (undated) DEVICE — NEEDLE BLUNT 18 G X 1 1/2IN

## (undated) DEVICE — NEEDLE 25G X 1 1/2

## (undated) DEVICE — CHLORAPREP HI-LITE 26ML ORANGE

## (undated) DEVICE — CLIPVAC DISPOSABLE FILTER ASSEMBLY - BD SURGICAL CLIPPER: Brand: CLIPVAC

## (undated) DEVICE — SURGICAL CLIPPER BLADE GENERAL USE

## (undated) DEVICE — STERILE POLYISOPRENE POWDER-FREE SURGICAL GLOVES: Brand: PROTEXIS

## (undated) DEVICE — SUT MONOCRYL 4-0 PS-2 27 IN Y426H

## (undated) DEVICE — BETHLEHEM UNIVERSAL OUTPATIENT: Brand: CARDINAL HEALTH

## (undated) DEVICE — SPONGE LAP 18 X 4 IN STRL RFD

## (undated) DEVICE — TIBURON TRANSVERSE LAPAROTOMY SHEET: Brand: CONVERTORS

## (undated) DEVICE — 3M™ STERI-STRIP™ COMPOUND BENZOIN TINCTURE 40 BAGS/CARTON 4 CARTONS/CASE C1544: Brand: 3M™ STERI-STRIP™